# Patient Record
Sex: MALE | Race: WHITE
[De-identification: names, ages, dates, MRNs, and addresses within clinical notes are randomized per-mention and may not be internally consistent; named-entity substitution may affect disease eponyms.]

---

## 2017-07-22 NOTE — PD.PSY.CON
Provisional Diagnosis


Admission Date





Axis I.


Adjustment disorder with depressed mood, history of OCD


Axis II.


Deferred


Axis III.


No significant medical history





History of Present Illness


Service


Psychiatry


Consult Requested By





Primary Care Physician


Unknown


HPI


The patient is a 27-year-old  man, domicile with his girlfriend in 

[place], unemployed, with psychiatric history of OCD, no psychiatric 

admissions, no current medications, history of self cutting, no suicide 

attempts or significant medical history, who comes emergency Department under 

Gomez act by police for allegedly making suicidal statements and cutting 

himself.  He states that his girlfriend was breaking up with him taking their 

kids with her, which made him upset causing him to cut himself.  Patient denies 

any homicidal or suicidal ideations.  On psychiatric evaluation today patient 

is calm, cooperative and a good spirit.  Patient says that yesterday he was 

frustrated and upset with his girlfriend because his giving him.  He might say 

that he wanted to cut himself, but he never made a suicidal statement.  He says 

that he was just trying to manipulate and to project the anger and his 

girlfriend.  At this moment the patient denies anhedonia, he denies hopelessness

, denies depression, he denies suicidal and homicidal ideation, he denies 

visual and auditory hallucinations.  Patient does report occasional use of 

alcohol and marijuana.





Review of Systems


Constitutional:  DENIES: Diaphoretic episodes, Fatigue, Fever, Weight gain, 

Weight loss, Chills, Dizziness, Change in appetite, Night Sweats


Endocrine:  DENIES: Heat/cold intolerance, Polydipsia, Polyuria, Polyphagia


Eyes:  DENIES: Blurred vision, Diplopia, Eye inflammation, Eye pain, Vision loss

, Photosensitivity, Double Vision


Ears, nose, mouth, throat:  DENIES: Tinnitus, Hearing loss, Vertigo, Nasal 

discharge, Oral lesions, Throat pain, Hoarseness, Ear Pain, Running Nose, 

Epistaxis, Sinus Pain, Toothache, Odynophagia


Respiratory:  DENIES: Apneas, Cough, Snoring, Wheezing, Hemoptysis, Sputum 

production, Shortness of breath


Cardiovascular:  DENIES: Chest pain, Palpitations, Syncope, Dyspnea on Exertion

, PND, Lower Extremity Edema, Orthopnea, Claudication


Gastrointestinal:  DENIES: Abdominal pain, Black stools, Bloody stools, 

Constipation, Diarrhea, Nausea, Vomiting, Difficulty Swallowing, Anorexia


Genitourinary:  DENIES: Sexual dysfunction, Urinary frequency, Urinary 

incontinence, Urgency, Hematuria, Dysuria, Nocturia, Penile Discharge, 

Testicular Pain, Testicular Swelling


Musculoskeletal:  DENIES: Joint pain, Muscle aches, Stiffness, Joint Swelling, 

Back pain, Neck pain


Immunologic/allergic:  DENIES: Eczema, Urticaria


Neurologic:  DENIES: Abnormal gait, Headache, Localized weakness, Paresthesias, 

Seizures, Speech Problems, Tremor, Poor Balance


Psychiatric:  DENIES: Anxiety, Confusion, Mood changes, Depression, 

Hallucinations, Agitation, Suicidal Ideation, Homicidal Ideation, Delusions





Past Family Social History


Coded Allergies:  


     No Known Allergies (Unverified , 7/22/17)


No Active Prescriptions or Reported Meds


Family History


He reports that his mother has depression


Social History


Patient was born and raised in [place], he lives with his girlfriend in [facility]

, he has 2 kids his unemployed, his level of education is high school


Patient's Strengths (min. 2)


Employed





Physical Exam


No tremors, no EPS, no withdrawal


Vital Signs





 Vital Signs








  Date Time  Temp Pulse Resp B/P Pulse Ox O2 Delivery O2 Flow Rate FiO2


 


7/22/17 08:15  73 16 130/77 97 Room Air  


 


7/22/17 00:12 98.6       








Lab Results





Labs





 Laboratory Tests








Test 7/22/17 7/22/17





 00:30 01:15


 


Urine Opiates Screen NEG  


 


Urine Barbiturates Screen NEG  


 


Urine Amphetamines Screen NEG  


 


Urine Benzodiazepines Screen NEG  


 


Urine Cocaine Screen NEG  


 


Urine Cannabinoids Screen POS  


 


White Blood Count  14.8 TH/MM3


 


Red Blood Count  5.25 MIL/MM3


 


Hemoglobin  16.2 GM/DL


 


Hematocrit  48.2 %


 


Mean Corpuscular Volume  91.7 FL


 


Mean Corpuscular Hemoglobin  30.9 PG


 


Mean Corpuscular Hemoglobin  33.7 %





Concent  


 


Red Cell Distribution Width  13.4 %


 


Platelet Count  281 TH/MM3


 


Mean Platelet Volume  7.4 FL


 


Neutrophils (%) (Auto)  87.4 %


 


Lymphocytes (%) (Auto)  9.3 %


 


Monocytes (%) (Auto)  3.0 %


 


Eosinophils (%) (Auto)  0.1 %


 


Basophils (%) (Auto)  0.2 %


 


Neutrophils # (Auto)  12.9 TH/MM3


 


Lymphocytes # (Auto)  1.4 TH/MM3


 


Monocytes # (Auto)  0.4 TH/MM3


 


Eosinophils # (Auto)  0.0 TH/MM3


 


Basophils # (Auto)  0.0 TH/MM3


 


CBC Comment  DIFF FINAL 


 


Differential Comment   


 


Sodium Level  141 MEQ/L


 


Potassium Level  4.1 MEQ/L


 


Chloride Level  106 MEQ/L


 


Carbon Dioxide Level  23.2 MEQ/L


 


Anion Gap  12 MEQ/L


 


Blood Urea Nitrogen  8 MG/DL


 


Creatinine  0.72 MG/DL


 


Estimat Glomerular Filtration  131 ML/MIN





Rate  


 


Random Glucose  82 MG/DL


 


Calcium Level  8.9 MG/DL


 


Total Bilirubin  0.3 MG/DL


 


Aspartate Amino Transf  19 U/L





(AST/SGOT)  


 


Alanine Aminotransferase  23 U/L





(ALT/SGPT)  


 


Alkaline Phosphatase  86 U/L


 


Total Protein  7.1 GM/DL


 


Albumin  4.2 GM/DL


 


Salicylates Level  LESS THAN 1.7





  MG/DL


 


Acetaminophen Level  LESS THAN 2.0





  MCG/ML


 


Ethyl Alcohol Level  19 MG/DL











Mental Status Examination


Appearance


 young man, age appearing, good hygiene, calm and cooperative


Speech:  Unremarkable


Orientation:  x3


Memory:  Unremarkable


Thought Process:  Logical


Thought Content:  Unremarkable


Hallucination Type:  None


Suicidal Ideation:  No


Previous Suicide Attempts:  No


Homicidal Ideation:  No


Previous Homicide Attempts:  No


Judgment:  WNL


Affect:  Good


Mood:  Appropriate


Motor Activity:  Normal gait





Assessment & Plan


Problem List:  


(1) Adjustment disorder with depressed mood


Assessment & Plan:  On psychiatric evaluation today patient does not present 

any evidence of depression, anxiety, antonio or psychosis.  Patient denies 

suicidal and homicidal ideation, he denies visual and auditory hallucinations.  

Recent self cutting statement to his girlfriend in the context of frustration 

and recent breakup as per patient was done with the purpose of manipulation and 

projecting anger.  Patient has a history of self cutting behavior which seems 

to be consistent with underlying personality pathology.  No admission indicated 

at this moment.  Support, motivation psycho education provided.  Gomez act will 

be lifted..


ICD Code:  F43.21


Assessment & Plan


Estimated LOS:  Homer Leon MD Jul 22, 2017 10:40

## 2017-07-22 NOTE — PD
HPI


Chief Complaint:  Psychiatric Symptoms


Time Seen by Provider:  01:38


Travel History


International Travel<30 days:  No


Contact w/Intl Traveler<30days:  No


Traveled to known affect area:  No





History of Present Illness


HPI


Patient comes emergency Department under [place] act by police for allegedly 

making suicidal statements and cutting himself.  He states that his girlfriend 

was breaking up with him taking their kids with her, which made him upset 

causing him to cut himself.  Patient denies any homicidal or suicidal 

ideations.  Patient denies any history of suicide attempt.  Patient denies any 

history of cutting himself before.  Patient denies any pain with this.  Denies 

anything making it better or worse.  Reports his tetanus shot is up-to-date.  

Denies any medical concerns at this time.  Denies any chest pain, shortness of 

breath, fevers, abdominal pain, or numbness tingling anywhere.





PFSH


Past Medical History


ADHD:  Yes


Cardiovascular Problems:  Yes (PRE SVT)


Psychiatric:  Yes (OCD)


Immunizations Current:  Yes


Tetanus Vaccination:  Unknown





Past Surgical History


Cardiac Surgery:  Yes (PSVT (CRYOABLATION))





Social History


Alcohol Use:  Yes


Tobacco Use:  No


Substance Use:  Yes (MARIJUANA RARELY)





Allergies-Medications


(Allergen,Severity, Reaction):  


Coded Allergies:  


     No Known Allergies (Unverified , 7/22/17)


Reported Meds & Prescriptions





Reported Meds & Active Scripts


Active


No Active Prescriptions or Reported Medications    








Review of Systems


Except as stated in HPI:  all other systems reviewed are Neg





Physical Exam


Narrative


GENERAL: Well-developed, well nourished, in no acute distress, and non-ill 

appearing.


SKIN: Multiple superficial nonrepairable self-inflicted wounds noted volar 

surface right forearm.


HEAD: Atraumatic. Normocephalic. 


EYES: Pupils equal and round. EOMI. No scleral icterus. No injection or 

drainage. 


ENT: No nasal bleeding or discharge.  Mucous membranes pink and moist.


NECK: Trachea midline. Supple.  No nuclear rigidity.


CARDIOVASCULAR: Regular rate and rhythm.  No murmur appreciated.


RESPIRATORY: No accessory muscle use.  No respiratory distress. Clear to 

auscultation. Breath sounds equal bilaterally. 


MUSCULOSKELETAL: No obvious deformities. No clubbing.  No cyanosis.  No edema.  

Full range of motion.


NEUROLOGICAL: Awake and alert. No obvious cranial nerve deficits.  Motor 

grossly within normal limits. Normal speech.


PSYCHIATRIC: Appropriate mood and affect; insight and judgment normal.





Data


Data


Last Documented VS





Vital Signs








  Date Time  Temp Pulse Resp B/P Pulse Ox O2 Delivery O2 Flow Rate FiO2


 


7/22/17 00:12 98.6 94 20 123/64 99 Room Air  








Orders





 Complete Blood Count With Diff (7/22/17 01:10)


Comprehensive Metabolic Panel (7/22/17 01:10)


Psych Screen (7/22/17 01:10)


Drug Screen, Random Urine (7/22/17 01:10)


Alcohol (Ethanol) (7/22/17 01:10)


Salicylates (Aspirin) (7/22/17 01:10)


Tylenol (Acetaminophen) (7/22/17 01:10)





Labs





 Laboratory Tests








Test 7/22/17 7/22/17





 00:30 01:15


 


Urine Opiates Screen NEG  


 


Urine Barbiturates Screen NEG  


 


Urine Amphetamines Screen NEG  


 


Urine Benzodiazepines Screen NEG  


 


Urine Cocaine Screen NEG  


 


Urine Cannabinoids Screen POS  


 


White Blood Count  14.8 TH/MM3


 


Red Blood Count  5.25 MIL/MM3


 


Hemoglobin  16.2 GM/DL


 


Hematocrit  48.2 %


 


Mean Corpuscular Volume  91.7 FL


 


Mean Corpuscular Hemoglobin  30.9 PG


 


Mean Corpuscular Hemoglobin  33.7 %





Concent  


 


Red Cell Distribution Width  13.4 %


 


Platelet Count  281 TH/MM3


 


Mean Platelet Volume  7.4 FL


 


Neutrophils (%) (Auto)  87.4 %


 


Lymphocytes (%) (Auto)  9.3 %


 


Monocytes (%) (Auto)  3.0 %


 


Eosinophils (%) (Auto)  0.1 %


 


Basophils (%) (Auto)  0.2 %


 


Neutrophils # (Auto)  12.9 TH/MM3


 


Lymphocytes # (Auto)  1.4 TH/MM3


 


Monocytes # (Auto)  0.4 TH/MM3


 


Eosinophils # (Auto)  0.0 TH/MM3


 


Basophils # (Auto)  0.0 TH/MM3


 


CBC Comment  DIFF FINAL 


 


Differential Comment   


 


Sodium Level  141 MEQ/L


 


Potassium Level  4.1 MEQ/L


 


Chloride Level  106 MEQ/L


 


Carbon Dioxide Level  23.2 MEQ/L


 


Anion Gap  12 MEQ/L


 


Blood Urea Nitrogen  8 MG/DL


 


Creatinine  0.72 MG/DL


 


Estimat Glomerular Filtration  131 ML/MIN





Rate  


 


Random Glucose  82 MG/DL


 


Calcium Level  8.9 MG/DL


 


Total Bilirubin  0.3 MG/DL


 


Aspartate Amino Transf  19 U/L





(AST/SGOT)  


 


Alanine Aminotransferase  23 U/L





(ALT/SGPT)  


 


Alkaline Phosphatase  86 U/L


 


Total Protein  7.1 GM/DL


 


Albumin  4.2 GM/DL


 


Salicylates Level  LESS THAN 1.7





  MG/DL


 


Acetaminophen Level  LESS THAN 2.0





  MCG/ML


 


Ethyl Alcohol Level  19 MG/DL











MDM


Medical Decision Making


Medical Screen Exam Complete:  Yes


Emergency Medical Condition:  Yes


Differential Diagnosis


Homicidal, suicidal, laceration, abrasion, contusion, other


Narrative Course


Patient was seen and examined.  Labs were obtained and reviewed.  Patient 

medically cleared for further treatment and evaluation by psych.  Final 

disposition per psych.





Diagnosis





 Primary Impression:  


 Deliberate self-cutting


 Additional Impression:  


 Medical clearance for psychiatric admission


Scripts


No Active Prescriptions or Reported Meds


Condition:  Stable








Naman Thao Jul 22, 2017 01:39

## 2018-06-19 ENCOUNTER — HOSPITAL ENCOUNTER (INPATIENT)
Dept: HOSPITAL 17 - NEPD | Age: 28
LOS: 2 days | Discharge: LEFT BEFORE DISCHARGE | DRG: 603 | End: 2018-06-21
Attending: HOSPITALIST | Admitting: HOSPITALIST
Payer: SELF-PAY

## 2018-06-19 VITALS — HEIGHT: 72 IN | WEIGHT: 147.71 LBS | BODY MASS INDEX: 20.01 KG/M2

## 2018-06-19 VITALS
RESPIRATION RATE: 20 BRPM | TEMPERATURE: 99.9 F | DIASTOLIC BLOOD PRESSURE: 58 MMHG | SYSTOLIC BLOOD PRESSURE: 123 MMHG | OXYGEN SATURATION: 98 % | HEART RATE: 103 BPM

## 2018-06-19 VITALS
SYSTOLIC BLOOD PRESSURE: 120 MMHG | HEART RATE: 89 BPM | RESPIRATION RATE: 18 BRPM | DIASTOLIC BLOOD PRESSURE: 56 MMHG | OXYGEN SATURATION: 100 %

## 2018-06-19 VITALS
TEMPERATURE: 100 F | OXYGEN SATURATION: 97 % | RESPIRATION RATE: 18 BRPM | SYSTOLIC BLOOD PRESSURE: 131 MMHG | HEART RATE: 88 BPM | DIASTOLIC BLOOD PRESSURE: 65 MMHG

## 2018-06-19 VITALS — RESPIRATION RATE: 16 BRPM | OXYGEN SATURATION: 98 % | TEMPERATURE: 99.4 F | HEART RATE: 89 BPM
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
## 2018-06-19 DIAGNOSIS — F12.90: ICD-10-CM

## 2018-06-19 DIAGNOSIS — E87.6: ICD-10-CM

## 2018-06-19 DIAGNOSIS — F19.90: ICD-10-CM

## 2018-06-19 DIAGNOSIS — L03.113: Primary | ICD-10-CM

## 2018-06-19 DIAGNOSIS — F17.210: ICD-10-CM

## 2018-06-19 DIAGNOSIS — T23.101A: ICD-10-CM
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
## 2018-06-19 DIAGNOSIS — I89.1: ICD-10-CM
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
## 2018-06-19 LAB
ALBUMIN SERPL-MCNC: 3.9 GM/DL (ref 3.4–5)
ALP SERPL-CCNC: 104 U/L (ref 45–117)
ALT SERPL-CCNC: 23 U/L (ref 12–78)
AST SERPL-CCNC: 18 U/L (ref 15–37)
BACTERIA #/AREA URNS HPF: (no result) /HPF
BASOPHILS # BLD AUTO: 0 TH/MM3 (ref 0–0.2)
BASOPHILS NFR BLD: 0.1 % (ref 0–2)
BILIRUB SERPL-MCNC: 0.5 MG/DL (ref 0.2–1)
BUN SERPL-MCNC: 7 MG/DL (ref 7–18)
CALCIUM SERPL-MCNC: 8.9 MG/DL (ref 8.5–10.1)
CHLORIDE SERPL-SCNC: 103 MEQ/L (ref 98–107)
COLOR UR: YELLOW
CREAT SERPL-MCNC: 0.8 MG/DL (ref 0.6–1.3)
EOSINOPHIL # BLD: 0 TH/MM3 (ref 0–0.4)
EOSINOPHIL NFR BLD: 0 % (ref 0–4)
ERYTHROCYTE [DISTWIDTH] IN BLOOD BY AUTOMATED COUNT: 14.1 % (ref 11.6–17.2)
GFR SERPLBLD BASED ON 1.73 SQ M-ARVRAT: 116 ML/MIN (ref 89–?)
GLUCOSE SERPL-MCNC: 98 MG/DL (ref 74–106)
GLUCOSE UR STRIP-MCNC: (no result) MG/DL
HCO3 BLD-SCNC: 22.6 MEQ/L (ref 21–32)
HCT VFR BLD CALC: 43.1 % (ref 39–51)
HGB BLD-MCNC: 14.3 GM/DL (ref 13–17)
HGB UR QL STRIP: (no result)
HYALINE CASTS #/AREA URNS LPF: 4 /LPF
KETONES UR STRIP-MCNC: (no result) MG/DL
LYMPHOCYTES # BLD AUTO: 0.6 TH/MM3 (ref 1–4.8)
LYMPHOCYTES NFR BLD AUTO: 3.3 % (ref 9–44)
MCH RBC QN AUTO: 30.7 PG (ref 27–34)
MCHC RBC AUTO-ENTMCNC: 33.2 % (ref 32–36)
MCV RBC AUTO: 92.5 FL (ref 80–100)
MONOCYTE #: 1.4 TH/MM3 (ref 0–0.9)
MONOCYTES NFR BLD: 7.9 % (ref 0–8)
MUCOUS THREADS #/AREA URNS LPF: (no result) /LPF
NEUTROPHILS # BLD AUTO: 16.3 TH/MM3 (ref 1.8–7.7)
NEUTROPHILS NFR BLD AUTO: 88.7 % (ref 16–70)
NITRITE UR QL STRIP: (no result)
PLATELET # BLD: 199 TH/MM3 (ref 150–450)
PMV BLD AUTO: 7.8 FL (ref 7–11)
PROT SERPL-MCNC: 7 GM/DL (ref 6.4–8.2)
RBC # BLD AUTO: 4.66 MIL/MM3 (ref 4.5–5.9)
SODIUM SERPL-SCNC: 137 MEQ/L (ref 136–145)
SP GR UR STRIP: 1.02 (ref 1–1.03)
URINE LEUKOCYTE ESTERASE: (no result)
WBC # BLD AUTO: 18.3 TH/MM3 (ref 4–11)

## 2018-06-19 PROCEDURE — 80307 DRUG TEST PRSMV CHEM ANLYZR: CPT

## 2018-06-19 PROCEDURE — 87040 BLOOD CULTURE FOR BACTERIA: CPT

## 2018-06-19 PROCEDURE — 81001 URINALYSIS AUTO W/SCOPE: CPT

## 2018-06-19 PROCEDURE — 83605 ASSAY OF LACTIC ACID: CPT

## 2018-06-19 PROCEDURE — 85025 COMPLETE CBC W/AUTO DIFF WBC: CPT

## 2018-06-19 PROCEDURE — 80053 COMPREHEN METABOLIC PANEL: CPT

## 2018-06-19 PROCEDURE — 84100 ASSAY OF PHOSPHORUS: CPT

## 2018-06-19 PROCEDURE — 84439 ASSAY OF FREE THYROXINE: CPT

## 2018-06-19 PROCEDURE — 73220 MRI UPPR EXTREMITY W/O&W/DYE: CPT

## 2018-06-19 PROCEDURE — 87086 URINE CULTURE/COLONY COUNT: CPT

## 2018-06-19 PROCEDURE — 84443 ASSAY THYROID STIM HORMONE: CPT

## 2018-06-19 PROCEDURE — 80202 ASSAY OF VANCOMYCIN: CPT

## 2018-06-19 PROCEDURE — 96368 THER/DIAG CONCURRENT INF: CPT

## 2018-06-19 PROCEDURE — A9579 GAD-BASE MR CONTRAST NOS,1ML: HCPCS

## 2018-06-19 PROCEDURE — 83735 ASSAY OF MAGNESIUM: CPT

## 2018-06-19 PROCEDURE — 83036 HEMOGLOBIN GLYCOSYLATED A1C: CPT

## 2018-06-19 PROCEDURE — 96365 THER/PROPH/DIAG IV INF INIT: CPT

## 2018-06-19 RX ADMIN — PHENYTOIN SODIUM SCH MLS/HR: 50 INJECTION INTRAMUSCULAR; INTRAVENOUS at 23:22

## 2018-06-19 RX ADMIN — OXYCODONE HYDROCHLORIDE AND ACETAMINOPHEN PRN TAB: 5; 325 TABLET ORAL at 23:16

## 2018-06-19 RX ADMIN — Medication SCH ML: at 23:22

## 2018-06-19 RX ADMIN — TAZOBACTAM SODIUM AND PIPERACILLIN SODIUM SCH MLS/HR: 375; 3 INJECTION, SOLUTION INTRAVENOUS at 23:22

## 2018-06-19 NOTE — PD
Data


Data


Last Documented VS





Vital Signs








  Date Time  Temp Pulse Resp B/P (MAP) Pulse Ox O2 Delivery O2 Flow Rate FiO2


 


6/19/18 18:16 99.4 89 16  98 Room Air  








Orders





 Orders


Sepsis Workup Initiated (6/19/18 )


Complete Blood Count With Diff (6/19/18 16:24)


Comprehensive Metabolic Panel (6/19/18 16:24)


Lactic Acid Sepsis Protocol (6/19/18 16:24)


Urinalysis - C+S If Indicated (6/19/18 16:24)


Blood Culture (6/19/18 16:24)


Blood Glucose (6/19/18 16:24)


Ecg Monitoring (6/19/18 16:24)


Iv Access Insert/Monitor (6/19/18 16:24)


Oximetry (6/19/18 16:24)


Oxygen Administration (6/19/18 16:24)


Acetaminophen (Tylenol) (6/19/18 16:30)


Piperacil-Tazo 4.5 Gm Premix (Zosyn 4.5 (6/19/18 16:24)


Vancomycin Inj (Vancomycin Inj) (6/19/18 16:24)


Sodium Chlor 0.9% 1000 Ml Inj (Ns 1000 M (6/19/18 16:24)


Sodium Chlor 0.9% 1000 Ml Inj (Ns 1000 M (6/19/18 16:24)


Urine Culture (6/19/18 17:00)


Admit Order (Ed Use Only) (6/19/18 )


Admit To Inpatient (6/19/18 )


Vital Signs (Adult) Q4H (6/19/18 18:40)


Activity Oob Ad Sridevi (6/19/18 18:40)


Diet Npo (6/19/18 Dinner)


Sodium Chlor 0.9% 1000 Ml Inj (Ns 1000 M (6/19/18 18:40)


Sodium Chloride 0.9% Flush (Ns Flush) (6/19/18 18:45)


Sodium Chloride 0.9% Flush (Ns Flush) (6/19/18 21:00)


Comprehensive Metabolic Panel (6/20/18 06:00)


Complete Blood Count With Diff (6/20/18 06:00)


Scd Bilateral/Knee High PUJA.BID (6/19/18 18:40)


Naloxone Inj (Narcan Inj) (6/19/18 18:45)


Magnesium Hydroxide Liq (Milk Of Magnesi (6/19/18 18:45)


Sennosides (Senokot) (6/19/18 18:45)


Bisacodyl Supp (Dulcolax Supp) (6/19/18 18:45)


Lactulose Liq (Lactulose Liq) (6/19/18 18:45)


Inpatient Certification (6/19/18 )


Vancomycin Consult Pharmacy (Vancomycin (6/19/18 18:45)


Piperacil-Tazo 3.375 Gm Premix (Zosyn 3. (6/19/18 18:45)


Consult Infectious Disease (6/19/18 )


Drug Screen, Random Urine (6/19/18 18:42)





Labs





Laboratory Tests








Test


  6/19/18


17:00


 


White Blood Count 18.3 TH/MM3 


 


Red Blood Count 4.66 MIL/MM3 


 


Hemoglobin 14.3 GM/DL 


 


Hematocrit 43.1 % 


 


Mean Corpuscular Volume 92.5 FL 


 


Mean Corpuscular Hemoglobin 30.7 PG 


 


Mean Corpuscular Hemoglobin


Concent 33.2 % 


 


 


Red Cell Distribution Width 14.1 % 


 


Platelet Count 199 TH/MM3 


 


Mean Platelet Volume 7.8 FL 


 


Neutrophils (%) (Auto) 88.7 % 


 


Lymphocytes (%) (Auto) 3.3 % 


 


Monocytes (%) (Auto) 7.9 % 


 


Eosinophils (%) (Auto) 0.0 % 


 


Basophils (%) (Auto) 0.1 % 


 


Neutrophils # (Auto) 16.3 TH/MM3 


 


Lymphocytes # (Auto) 0.6 TH/MM3 


 


Monocytes # (Auto) 1.4 TH/MM3 


 


Eosinophils # (Auto) 0.0 TH/MM3 


 


Basophils # (Auto) 0.0 TH/MM3 


 


CBC Comment DIFF FINAL 


 


Differential Comment  


 


Urine Color YELLOW 


 


Urine Turbidity CLEAR 


 


Urine pH 6.0 


 


Urine Specific Gravity 1.016 


 


Urine Protein NEG mg/dL 


 


Urine Glucose (UA) NEG mg/dL 


 


Urine Ketones TRACE mg/dL 


 


Urine Occult Blood NEG 


 


Urine Nitrite NEG 


 


Urine Bilirubin NEG 


 


Urine Urobilinogen 2.0 mg/dL 


 


Urine Leukocyte Esterase NEG 


 


Urine RBC 3 /hpf 


 


Urine WBC 1 /hpf 


 


Urine Bacteria RARE /hpf 


 


Urine Hyaline Casts 4 /lpf 


 


Urine Mucus FEW /lpf 


 


Microscopic Urinalysis Comment


  CATH-CULTURE


IND


 


Blood Urea Nitrogen 7 MG/DL 


 


Creatinine 0.80 MG/DL 


 


Random Glucose 98 MG/DL 


 


Total Protein 7.0 GM/DL 


 


Albumin 3.9 GM/DL 


 


Calcium Level 8.9 MG/DL 


 


Alkaline Phosphatase 104 U/L 


 


Aspartate Amino Transf


(AST/SGOT) 18 U/L 


 


 


Alanine Aminotransferase


(ALT/SGPT) 23 U/L 


 


 


Total Bilirubin 0.5 MG/DL 


 


Sodium Level 137 MEQ/L 


 


Potassium Level 3.4 MEQ/L 


 


Chloride Level 103 MEQ/L 


 


Carbon Dioxide Level 22.6 MEQ/L 


 


Anion Gap 11 MEQ/L 


 


Estimat Glomerular Filtration


Rate 116 ML/MIN 


 


 


Lactic Acid Level 1.3 mmol/L 











MDM


Supervised Visit with HEENA:  No


Narrative Course


27-year-old man with infection the hand with lymphangitis spreading up all the 

way to the axilla with lymphadenitis associated.  Patient with tachycardia 

meets criteria for sepsis.  Will be admitted for IV antibiotics.


Diagnosis





 Primary Impression:  


 Cellulitis


 Qualified Codes:  L03.113 - Cellulitis of right upper limb


 Additional Impression:  


 Lymphadenitis


Scripts


No Active Prescriptions or Reported Meds


Condition:  Stable











Jose Manuel Payne MD Jun 19, 2018 19:24

## 2018-06-19 NOTE — HHI.HP
__________________________________________________





\[facility] [place] [facility]\""


Service


[facility]


Primary Care Physician


No Primary Care Physician


Admission Diagnosis





sepsis, cellulitis


Diagnoses:  


Travel History


International Travel<30 Days:  No


Contact w/Intl Traveler <30 Da:  No


Traveled to Known Affected Are:  No


History of Present Illness


27-year-old male with no significant past medical history presents the 

emergency department for evaluation of swelling of his right hand.  The patient 

reports that approximately 3 days ago he burned himself on the oven at work.  

He states that since that time his hand has become more red and swollen and now 

has red streaks going up his arm towards his axilla.  He endorses subjective, 

severe fever/chills.  No chest pain or shortness of breath.  No cough.  No 

abdominal pain.  No nausea/vomiting/diarrhea.





Review of Systems


Except as stated in HPI:  all other systems reviewed are Neg





Past Family Social History


Past Medical History


None


Past Surgical History


Cardiac ablation for unspecified tachycardia


Reported Medications





Reported Meds & Active Scripts


Active


No Active Prescriptions or Reported Medications


Allergies:  


Coded Allergies:  


     No Known Allergies (Verified  Allergy, Unknown, 18)


Family History


Negative for CAD/DM


Social History


Smokes approximately 1 pack per day.  Occasional alcohol.  Positive marijuana.  

Denies all other illicit drugs.





Physical Exam


Vital Signs





Vital Signs








  Date Time  Temp Pulse Resp B/P (MAP) Pulse Ox O2 Delivery O2 Flow Rate FiO2


 


18 18:16 99.4 89 16  98 Room Air  


 


18 16:45     100 Room Air  


 


18 16:45  89 18 120/56 (77) 100 Room Air  


 


18 16:45  89 18 120/56 (77) 100 Room Air  


 


18 16:00 99.9 103 20 123/58 (79) 98   








Physical Exam


GENERAL:  male lying in bed


SKIN: Significant edema and erythema of the right hand worse on the dorsal 

aspect.  3 areas of first-degree burn on the knuckles.  Lymphangitis up the arm 

with axillary lymphadenopathy.


HEAD: Atraumatic. Normocephalic. No temporal or scalp tenderness.


EYES: Pupils equal round and reactive. Extraocular motions intact. No scleral 

icterus. No injection or drainage. 


ENT: Nose without bleeding, purulent drainage or septal hematoma. Throat 

without erythema, tonsillar hypertrophy or exudate. Uvula midline. Airway 

patent.


NECK: Trachea midline. No JVD or lymphadenopathy. Supple, nontender, no 

meningeal signs.


CARDIOVASCULAR: Regular rate and rhythm without murmurs, gallops, or rubs. 


RESPIRATORY: Clear to auscultation. Breath sounds equal bilaterally. No wheezes

, rales, or rhonchi.  


GASTROINTESTINAL: Abdomen soft, non-tender, nondistended. No hepato-splenomegaly

, or palpable masses. No guarding.


MUSCULOSKELETAL: Extremities without clubbing, cyanosis, or edema. No joint 

tenderness, effusion, or edema noted. No calf tenderness. 


NEUROLOGICAL: Awake and alert. Cranial nerves II through XII intact.  Motor and 

sensory grossly within normal limits. Normal speech.


Laboratory





Laboratory Tests








Test


  18


17:00


 


White Blood Count 18.3 


 


Red Blood Count 4.66 


 


Hemoglobin 14.3 


 


Hematocrit 43.1 


 


Mean Corpuscular Volume 92.5 


 


Mean Corpuscular Hemoglobin 30.7 


 


Mean Corpuscular Hemoglobin


Concent 33.2 


 


 


Red Cell Distribution Width 14.1 


 


Platelet Count 199 


 


Mean Platelet Volume 7.8 


 


Neutrophils (%) (Auto) 88.7 


 


Lymphocytes (%) (Auto) 3.3 


 


Monocytes (%) (Auto) 7.9 


 


Eosinophils (%) (Auto) 0.0 


 


Basophils (%) (Auto) 0.1 


 


Neutrophils # (Auto) 16.3 


 


Lymphocytes # (Auto) 0.6 


 


Monocytes # (Auto) 1.4 


 


Eosinophils # (Auto) 0.0 


 


Basophils # (Auto) 0.0 


 


CBC Comment DIFF FINAL 


 


Differential Comment  


 


Urine Color YELLOW 


 


Urine Turbidity CLEAR 


 


Urine pH 6.0 


 


Urine Specific Gravity 1.016 


 


Urine Protein NEG 


 


Urine Glucose (UA) NEG 


 


Urine Ketones TRACE 


 


Urine Occult Blood NEG 


 


Urine Nitrite NEG 


 


Urine Bilirubin NEG 


 


Urine Urobilinogen 2.0 


 


Urine Leukocyte Esterase NEG 


 


Urine RBC 3 


 


Urine WBC 1 


 


Urine Bacteria RARE 


 


Urine Hyaline Casts 4 


 


Urine Mucus FEW 


 


Microscopic Urinalysis Comment


  CATH-CULTURE


IND


 


Blood Urea Nitrogen 7 


 


Creatinine 0.80 


 


Random Glucose 98 


 


Total Protein 7.0 


 


Albumin 3.9 


 


Calcium Level 8.9 


 


Alkaline Phosphatase 104 


 


Aspartate Amino Transf


(AST/SGOT) 18 


 


 


Alanine Aminotransferase


(ALT/SGPT) 23 


 


 


Total Bilirubin 0.5 


 


Sodium Level 137 


 


Potassium Level 3.4 


 


Chloride Level 103 


 


Carbon Dioxide Level 22.6 


 


Anion Gap 11 


 


Estimat Glomerular Filtration


Rate 116 


 


 


Lactic Acid Level 1.3 














 Date/Time


Source Procedure


Growth Status


 


 


 18 17:00


Blood Peripheral Aerobic Blood Culture


Pending Received


 


 18 17:00


Blood Peripheral Anaerobic Blood Culture


Pending Received


 


 18 17:00


Urine Catheterized Urine Urine Culture


Pending Received








Result Diagram:  


18 1700                                                                   

             18 1700








Caprini VTE Risk Assessment


Caprini VTE Risk Assessment:  No/Low Risk (score <= 1)


Caprini Risk Assessment Model











 Point Value = 1          Point Value = 2  Point Value = 3  Point Value = 5


 


Age 41-60


Minor surgery


BMI > 25 kg/m2


Swollen legs


Varicose veins


Pregnancy or postpartum


History of unexplained or recurrent


   spontaneous 


Oral contraceptives or hormone


   replacement


Sepsis (< 1 month)


Serious lung disease, including


   pneumonia (< 1 month)


Abnormal pulmonary function


Acute myocardial infarction


Congestive heart failure (< 1 month)


History of inflammatory bowel disease


Medical patient at bed rest Age 61-74


Arthroscopic surgery


Major open surgery (> 45 min)


Laparoscopic surgery (> 45 min)


Malignancy


Confined to bed (> 72 hours)


Immobilizing plaster cast


Central venous access Age >= 75


History of VTE


Family history of VTE


Factor V Leiden


Prothrombin [id number]


Lupus anticoagulant


Anticardiolipin antibodies


Elevated serum homocysteine


Heparin-induced thrombocytopenia


Other congenital or acquired


   thrombophilia Stroke (< 1 month)


Elective arthroplasty


Hip, pelvis, or leg fracture


Acute spinal cord injury (< 1 month)








Prophylaxis Regimen











   Total Risk


Factor Score Risk Level Prophylaxis Regimen


 


0-1      Low Early ambulation


 


2 Moderate Order ONE of the following:


*Sequential Compression Device (SCD)


*Heparin 5000 units SQ BID


 


3-4 Higher Order ONE of the following medications:


*Heparin 5000 units SQ TID


*Enoxaparin/Lovenox 40 mg SQ daily (WT < 150 kg, CrCl > 30 mL/min)


*Enoxaparin/Lovenox 30 mg SQ daily (WT < 150 kg, CrCl > 10-29 mL/min)


*Enoxaparin/Lovenox 30 mg SQ BID (WT < 150 kg, CrCl > 30 mL/min)


AND/OR


*Sequential Compression Device (SCD)


 


5 or more Highest Order ONE of the following medications:


*Heparin 5000 units SQ TID (Preferred with Epidurals)


*Enoxaparin/Lovenox 40 mg SQ daily (WT < 150 kg, CrCl > 30 mL/min)


*Enoxaparin/Lovenox 30 mg SQ daily (WT < 150 kg, CrCl > 10-29 mL/min)


*Enoxaparin/Lovenox 30 mg SQ BID (WT < 150 kg, CrCl > 30 mL/min)


AND


*Sequential Compression Device (SCD)











Assessment and Plan


Assessment and Plan


Assessment/plan:





1.  Cellulitis/lymphangitis/sepsis


Blood cultures pending


Vancomycin/Zosyn


Infectious disease consulted, appreciate recommendations





2.  Hypokalemia


Patient with mild hypokalemia


Status post p.o. supplementation


Monitor BMP





FEN


Regular diet


Electrolytes: As above


NS at 100 cc/hour





Physician Certification


2 Midnight Certification Type:  Admission for Inpatient Services


Order for Inpatient Services


The services are ordered in accordance with Medicare regulations or non-

Medicare payer requirements, as applicable.  In the case of services not 

specified as inpatient-only, they are appropriately provided as inpatient 

services in accordance with the 2-midnight benchmark.


Estimated LOS (days):  2


2 days is the estimated time the patient will need to remain in the hospital, 

assuming treatment plan goals are met and no additional complications.


Post-Hospital Plan:  Not yet determined











Caroline Wallace MD 2018 19:56

## 2018-06-19 NOTE — PD
HPI


Chief Complaint:  Skin Problem


Time Seen by Provider:  16:16


Travel History


International Travel<30 days:  No


Contact w/Intl Traveler<30days:  No


Traveled to known affect area:  No





History of Present Illness


HPI


The patient is a 27-year-old  male who presents to the emergency 

department via private vehicle for right hand swelling.  The patient states he 

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 

superficial burns to the extensor surface of the hand.  The patient does note 

multiple sores of the extensor surface of the hand over the MCPs as well as the 

base of the right thumb.  He now notes increased swelling to the right hand 

with erythema no streaks of the right upper extremity to the right axilla.  He 

does complain of swelling under the right axilla and over the lateral aspect of 

the right neck.  He does note subjective fever.  He denies any history of MRSA, 

IV drug use, HIV, or diabetes.  Symptoms are moderate.  There are no current 

alleviating factors.  The patient cannot recall his last tetanus shot.  The 

patient is left-hand dominant.





PFSH


Past Medical History


ADHD:  Yes


Cardiovascular Problems:  Yes (PRE SVT)


Psychiatric:  Yes (OCD)


Immunizations Current:  Yes





Past Surgical History


*** Narrative Surgical


Cardiac ablation, removal of testicular appendix, tympanostomy tubes


Cardiac Surgery:  Yes (PSVT (CRYOABLATION))





Social History


Alcohol Use:  Yes


Tobacco Use:  No


Substance Use:  Yes (MARIJUANA, KRATOM)





Allergies-Medications


(Allergen,Severity, Reaction):  


Coded Allergies:  


     No Known Allergies (Verified  Adverse Reaction, Unknown, 6/19/18)


Reported Meds & Prescriptions





Reported Meds & Active Scripts


Active


No Active Prescriptions or Reported Medications    








Review of Systems


Except as stated in HPI:  all other systems reviewed are Neg


General / Constitutional:  Positive: Fever


HENT:  Positive: Neck Pain


Cardiovascular:  No: Chest Pain or Discomfort


Respiratory:  No: Shortness of Breath


Gastrointestinal:  No: Nausea, Vomiting, Abdominal Pain


Genitourinary:  No: Dysuria


Musculoskeletal:  Positive: Limited ROM, Edema, Pain


Skin:  Positive Other (As noted in the history of present illness)


Neurologic:  No: Paresthesia, Sensory Disturbance





Physical Exam


Narrative


GENERAL: Awake, alert, pleasant 27-year-old male who appears his stated age and 

is in no acute respiratory distress.


SKIN: Focused skin assessment warm/dry.  Multiple open skin wounds on the 

extensor surface of the right hand of the MCP.


HEAD: Atraumatic. Normocephalic. 


EYES: Pupils equal and round. No scleral icterus. No injection or drainage. 


ENT: No nasal bleeding or discharge.  Mucous membranes pink and moist.


NECK: Trachea midline. No JVD.  Lymphadenopathy noted behind the right 

sternocleidomastoid which is tender to palpation.  


CARDIOVASCULAR: Regular, tachycardic with a heart rate 105.


RESPIRATORY: No accessory muscle use. Clear to auscultation. Breath sounds 

equal bilaterally. 


GASTROINTESTINAL: Abdomen soft, non-tender, nondistended. Hepatic and splenic 

margins not palpable. 


MUSCULOSKELETAL: The right hand is swollen compared to the left.  The patient 

does have multiple skin lesions on extensor surface of the right hand and the 

base of the right thumb with erythema, there is lymphadenitis noted of the 

right extremity to the axilla.  There is right epitrochlear lymphadenopathy and 

right axillary lymphadenopathy noted and palpated.  Positive right radial 

pulse.  Limited range of motion of the right hand secondary to edema and pain.


NEUROLOGICAL: Awake and alert. No obvious cranial nerve deficits.  Motor 

grossly within normal limits. Normal speech.


PSYCHIATRIC: Appropriate mood and affect; insight and judgment normal.





Data


Data


Last Documented VS





Vital Signs








  Date Time  Temp Pulse Resp B/P (MAP) Pulse Ox O2 Delivery O2 Flow Rate FiO2


 


6/19/18 16:00 99.9 103 20 123/58 (79) 98   








Orders





 Orders


Sepsis Workup Initiated (6/19/18 )


Complete Blood Count With Diff (6/19/18 16:24)


Comprehensive Metabolic Panel (6/19/18 16:24)


Lactic Acid Sepsis Protocol (6/19/18 16:24)


Urinalysis - C+S If Indicated (6/19/18 16:24)


Blood Culture (6/19/18 16:24)


Blood Glucose (6/19/18 16:24)


Ecg Monitoring (6/19/18 16:24)


Iv Access Insert/Monitor (6/19/18 16:24)


Oximetry (6/19/18 16:24)


Oxygen Administration (6/19/18 16:24)


Acetaminophen (Tylenol) (6/19/18 16:30)


Piperacil-Tazo 4.5 Gm Premix (Zosyn 4.5 (6/19/18 16:24)


Vancomycin Inj (Vancomycin Inj) (6/19/18 16:24)


Sodium Chlor 0.9% 1000 Ml Inj (Ns 1000 M (6/19/18 16:24)


Sodium Chlor 0.9% 1000 Ml Inj (Ns 1000 M (6/19/18 16:24)








MDM


Medical Decision Making


Medical Screen Exam Complete:  Yes


Emergency Medical Condition:  Yes


Medical Record Reviewed:  Yes


Differential Diagnosis


Differential diagnosis includes lymphadenitis, lymphangitis, cellulitis, 

infected wound, bacteremia, septicemia, abscess, cellulitis.


Narrative Course


IV was established, labs are drawn and sent, and the patient was placed on 

cardiac telemetry monitoring and continuous pulse oximetry monitoring.  Blood 

cultures were sent to lab.  CBC and lactic acid were sent to lab.  The patient 

was then administered Zosyn and vancomycin.  The patient was administered 2 L 

of IV fluids.  It appears the patient has infected wounds to the right hand 

with cellulitis and secondary lymphangitis/lymphadenitis.  The patient was 

signed out to the oncoming physician at 5 PM.





Diagnosis





 Primary Impression:  


 Cellulitis


 Qualified Codes:  L03.113 - Cellulitis of right upper limb


 Additional Impression:  


 Lymphadenitis





Admitting Information


Admitting Physician Requests:  Admit


Scripts


No Active Prescriptions or Reported Meds


Condition:  Stable











Errol Salazar MD Jun 19, 2018 16:33

## 2018-06-20 VITALS
TEMPERATURE: 99.7 F | SYSTOLIC BLOOD PRESSURE: 118 MMHG | HEART RATE: 82 BPM | DIASTOLIC BLOOD PRESSURE: 66 MMHG | OXYGEN SATURATION: 99 % | RESPIRATION RATE: 16 BRPM

## 2018-06-20 VITALS
DIASTOLIC BLOOD PRESSURE: 57 MMHG | TEMPERATURE: 98.1 F | HEART RATE: 82 BPM | SYSTOLIC BLOOD PRESSURE: 115 MMHG | RESPIRATION RATE: 20 BRPM | OXYGEN SATURATION: 98 %

## 2018-06-20 VITALS
SYSTOLIC BLOOD PRESSURE: 112 MMHG | TEMPERATURE: 98.6 F | HEART RATE: 73 BPM | DIASTOLIC BLOOD PRESSURE: 59 MMHG | RESPIRATION RATE: 16 BRPM | OXYGEN SATURATION: 98 %

## 2018-06-20 VITALS
DIASTOLIC BLOOD PRESSURE: 70 MMHG | RESPIRATION RATE: 16 BRPM | OXYGEN SATURATION: 97 % | HEART RATE: 64 BPM | TEMPERATURE: 98 F | SYSTOLIC BLOOD PRESSURE: 127 MMHG

## 2018-06-20 VITALS
HEART RATE: 92 BPM | RESPIRATION RATE: 19 BRPM | SYSTOLIC BLOOD PRESSURE: 142 MMHG | DIASTOLIC BLOOD PRESSURE: 60 MMHG | OXYGEN SATURATION: 98 % | TEMPERATURE: 99.1 F

## 2018-06-20 VITALS — OXYGEN SATURATION: 98 %

## 2018-06-20 LAB
ALBUMIN SERPL-MCNC: 3.1 GM/DL (ref 3.4–5)
ALP SERPL-CCNC: 115 U/L (ref 45–117)
ALT SERPL-CCNC: 42 U/L (ref 12–78)
AST SERPL-CCNC: 22 U/L (ref 15–37)
BASOPHILS # BLD AUTO: 0 TH/MM3 (ref 0–0.2)
BASOPHILS NFR BLD: 0.2 % (ref 0–2)
BILIRUB SERPL-MCNC: 0.8 MG/DL (ref 0.2–1)
BUN SERPL-MCNC: 6 MG/DL (ref 7–18)
CALCIUM SERPL-MCNC: 8.6 MG/DL (ref 8.5–10.1)
CHLORIDE SERPL-SCNC: 111 MEQ/L (ref 98–107)
CREAT SERPL-MCNC: 0.62 MG/DL (ref 0.6–1.3)
EOSINOPHIL # BLD: 0.1 TH/MM3 (ref 0–0.4)
EOSINOPHIL NFR BLD: 0.7 % (ref 0–4)
ERYTHROCYTE [DISTWIDTH] IN BLOOD BY AUTOMATED COUNT: 14.1 % (ref 11.6–17.2)
GFR SERPLBLD BASED ON 1.73 SQ M-ARVRAT: 156 ML/MIN (ref 89–?)
GLUCOSE SERPL-MCNC: 100 MG/DL (ref 74–106)
HCO3 BLD-SCNC: 19.6 MEQ/L (ref 21–32)
HCT VFR BLD CALC: 40.9 % (ref 39–51)
HGB BLD-MCNC: 13.4 GM/DL (ref 13–17)
LYMPHOCYTES # BLD AUTO: 1 TH/MM3 (ref 1–4.8)
LYMPHOCYTES NFR BLD AUTO: 7.6 % (ref 9–44)
MCH RBC QN AUTO: 30.2 PG (ref 27–34)
MCHC RBC AUTO-ENTMCNC: 32.9 % (ref 32–36)
MCV RBC AUTO: 91.8 FL (ref 80–100)
MONOCYTE #: 1.1 TH/MM3 (ref 0–0.9)
MONOCYTES NFR BLD: 8.2 % (ref 0–8)
NEUTROPHILS # BLD AUTO: 11 TH/MM3 (ref 1.8–7.7)
NEUTROPHILS NFR BLD AUTO: 83.3 % (ref 16–70)
PLATELET # BLD: 185 TH/MM3 (ref 150–450)
PMV BLD AUTO: 8.2 FL (ref 7–11)
PROT SERPL-MCNC: 5.9 GM/DL (ref 6.4–8.2)
RBC # BLD AUTO: 4.45 MIL/MM3 (ref 4.5–5.9)
SODIUM SERPL-SCNC: 141 MEQ/L (ref 136–145)
WBC # BLD AUTO: 13.2 TH/MM3 (ref 4–11)

## 2018-06-20 RX ADMIN — TAZOBACTAM SODIUM AND PIPERACILLIN SODIUM SCH MLS/HR: 375; 3 INJECTION, SOLUTION INTRAVENOUS at 05:20

## 2018-06-20 RX ADMIN — TAZOBACTAM SODIUM AND PIPERACILLIN SODIUM SCH MLS/HR: 375; 3 INJECTION, SOLUTION INTRAVENOUS at 17:01

## 2018-06-20 RX ADMIN — OXYCODONE HYDROCHLORIDE AND ACETAMINOPHEN PRN TAB: 10; 325 TABLET ORAL at 12:46

## 2018-06-20 RX ADMIN — Medication SCH ML: at 20:30

## 2018-06-20 RX ADMIN — OXYCODONE HYDROCHLORIDE AND ACETAMINOPHEN PRN TAB: 5; 325 TABLET ORAL at 07:51

## 2018-06-20 RX ADMIN — Medication SCH ML: at 07:52

## 2018-06-20 RX ADMIN — MORPHINE SULFATE PRN MG: 2 INJECTION, SOLUTION INTRAMUSCULAR; INTRAVENOUS at 22:06

## 2018-06-20 RX ADMIN — PHENYTOIN SODIUM SCH MLS/HR: 50 INJECTION INTRAMUSCULAR; INTRAVENOUS at 07:52

## 2018-06-20 RX ADMIN — PHENYTOIN SODIUM SCH MLS/HR: 50 INJECTION INTRAMUSCULAR; INTRAVENOUS at 12:57

## 2018-06-20 RX ADMIN — STANDARDIZED SENNA CONCENTRATE AND DOCUSATE SODIUM SCH TAB: 8.6; 5 TABLET, FILM COATED ORAL at 20:30

## 2018-06-20 RX ADMIN — OXYCODONE HYDROCHLORIDE AND ACETAMINOPHEN PRN TAB: 10; 325 TABLET ORAL at 17:32

## 2018-06-20 RX ADMIN — SODIUM CHLORIDE SCH MLS/HR: 900 INJECTION INTRAVENOUS at 22:00

## 2018-06-20 RX ADMIN — PHENYTOIN SODIUM SCH MLS/HR: 50 INJECTION INTRAMUSCULAR; INTRAVENOUS at 04:40

## 2018-06-20 RX ADMIN — TAZOBACTAM SODIUM AND PIPERACILLIN SODIUM SCH MLS/HR: 375; 3 INJECTION, SOLUTION INTRAVENOUS at 21:45

## 2018-06-20 RX ADMIN — TAZOBACTAM SODIUM AND PIPERACILLIN SODIUM SCH MLS/HR: 375; 3 INJECTION, SOLUTION INTRAVENOUS at 10:27

## 2018-06-20 RX ADMIN — ENOXAPARIN SODIUM SCH MG: 40 INJECTION SUBCUTANEOUS at 12:46

## 2018-06-20 NOTE — PD.ID.CON
History of Present Illness


Service


ID


Consult Requested By





Reason for Consult


Evaluation and Mment of right hand abscess and cellulitis.


Primary Care Physician


No Primary Care Physician


Diagnoses:  


History of Present Illness


 is a 28 y/o CM with no significant PMHx presents to the emergency 

department for evaluation of swelling of his right hand.  The patient reports 

that approximately 3 days ago he burned himself on the oven at work.  He states 

that since that time his hand has become more red and swollen and now has red 

streaks going up his arm towards his axilla.  





He endorses subjective, severe fever/chills.  


No chest pain or shortness of breath.  


No cough. No abdominal pain.  


No nausea/vomiting/diarrhea.





denies any prior endocarditis, discitis or epidural abscesses.





ID consulted for evaluation and Mment of right hand abscess and cellulitis 

possible tendinitis.





Review of Systems


ROS Limitations:  Poor Historian





Past Family Social History


Allergies:  


Coded Allergies:  


     No Known Allergies (Verified  Allergy, Unknown, 6/19/18)


Past Medical History


Cardiac ablation for unspecified tachycardia


IVDA


Past Surgical History


none per patient.


Reported Medications





Reported Meds & Active Scripts


Active


No Active Prescriptions or Reported Medications


Active Ordered Medications





Current Medications








 Medications


  (Trade)  Dose


 Ordered  Sig/Rigo


 Route  Start Time


 Stop Time Status Last Admin


 


 Sodium Chloride  1,000 ml @ 


 100 mls/hr  Q10H


 IV  6/19/18 18:40


    6/20/18 12:57


 


 


  (NS Flush)  2 ml  UNSCH  PRN


 IV FLUSH  6/19/18 18:45


     


 


 


  (NS Flush)  2 ml  BID


 IV FLUSH  6/19/18 21:00


    6/20/18 07:52


 


 


  (Narcan Inj)  0.4 mg  UNSCH  PRN


 IV PUSH  6/19/18 18:45


     


 


 


  (Milk Of


 Magnesia Liq)  30 ml  Q12H  PRN


 PO  6/19/18 18:45


     


 


 


  (Senokot)  17.2 mg  Q12H  PRN


 PO  6/19/18 18:45


     


 


 


  (Dulcolax Supp)  10 mg  DAILY  PRN


 RECTAL  6/19/18 18:45


     


 


 


  (Lactulose Liq)  30 ml  DAILY  PRN


 PO  6/19/18 18:45


     


 


 


 Pharmacy Profile


 Note  0 ml @ 0


 mls/hr  UNSCH


 OTHER  6/19/18 18:45


     


 


 


 Piperacillin Sod/


 Tazobactam Sod  50 ml @ 


 100 mls/hr  Q6H


 IV  6/19/18 23:00


    6/20/18 10:27


 


 


  ([facility]


 Ordered Lab Info)  SPECIFIC


 LAB TO BE


 ...  ONCE  ONCE


 .XX  6/20/18 16:45


 6/20/18 16:46   


 


 


  (Habitrol 14 Mg


 Patch.24 Hr)  1 patch  DAILY


 T-DERMAL  6/21/18 09:00


     


 


 


  (NS Flush)  2 ml  UNSCH  PRN


 IV FLUSH  6/20/18 12:15


     


 


 


  (NS Flush)  2 ml  BID


 IV FLUSH  6/20/18 21:00


     


 


 


  (Tylenol)  650 mg  Q4H  PRN


 PO  6/20/18 12:15


     


 


 


  (Zofran  Odt)  4 mg  Q6H  PRN


 SL  6/20/18 12:15


     


 


 


  (Reglan Inj)  5 mg  Q6H  PRN


 IV PUSH  6/20/18 12:15


     


 


 


  (Lovenox Inj)  40 mg  Q24H


 SQ  6/20/18 13:00


    6/20/18 12:46


 


 


  (Tylenol)  650 mg  Q6H  PRN


 PO  6/20/18 12:15


     


 


 


  (Percocet  5-325


 Mg)  1 tab  Q6H  PRN


 PO  6/20/18 12:15


     


 


 


  (Percocet 


 Mg)  1 tab  Q6H  PRN


 PO  6/20/18 12:15


    6/20/18 12:46


 


 


  (Morphine Inj)  2 mg  Q3H  PRN


 IV PUSH  6/20/18 12:15


     


 


 


  (Morphine Inj)  4 mg  Q3H  PRN


 IV PUSH  6/20/18 12:15


     


 


 


  (Morphine Inj)  4 mg  Q3H  PRN


 IV PUSH  6/20/18 12:15


     


 


 


  (Narcan Inj)  0.4 mg  UNSCH  PRN


 IV PUSH  6/20/18 12:15


     


 


 


  (Ashely-Colace)  1 tab  BID


 PO  6/20/18 21:00


     


 


 


  (Milk Of


 Magnesia Liq)  30 ml  Q12H  PRN


 PO  6/20/18 12:15


     


 


 


  (Senokot)  17.2 mg  Q12H  PRN


 PO  6/20/18 12:15


     


 


 


  (Dulcolax Supp)  10 mg  DAILY  PRN


 RECTAL  6/20/18 12:15


     


 


 


  (Lactulose Liq)  30 ml  DAILY  PRN


 PO  6/20/18 12:15


     


 


 


 Miscellaneous


 Information  1  DAILY


 T-DERMAL  6/21/18 09:00


     


 








Family History


reviewed and NC to current ID problems


Social History


Smokes approximately 1 pack per day.  Occasional alcohol.  Positive marijuana.  

Admits to IVDA.


Works in a restaurant and also .





Physical Exam


Vital Signs





Vital Signs








  Date Time  Temp Pulse Resp B/P (MAP) Pulse Ox O2 Delivery O2 Flow Rate FiO2


 


6/20/18 07:49 98.6 73 16 112/59 (76) 98   


 


6/20/18 04:00 98.0 64 16 127/70 (89) 97   


 


6/20/18 00:16   18     


 


6/20/18 00:00 99.1 92 19 142/60 (87) 98   


 


6/19/18 20:40 100.0 88 18 131/65 (87) 97   


 


6/19/18 20:31        


 


6/19/18 18:16 99.4 89 16  98 Room Air  


 


6/19/18 16:45     100 Room Air  


 


6/19/18 16:45  89 18 120/56 (77) 100 Room Air  


 


6/19/18 16:45  89 18 120/56 (77) 100 Room Air  


 


6/19/18 16:00 99.9 103 20 123/58 (79) 98   








Physical Exam


GENERAL: This is a well-nourished, well-developed patient, in no apparent 

distress.


SKIN: No rashes, ecchymoses or lesions. Cool and dry.


HEAD: Atraumatic. Normocephalic. No temporal or scalp tenderness.


EYES: Pupils equal round and reactive. No scleral icterus. No injection or 

drainage. 


ENT: Nose without bleeding, purulent drainage or septal hematoma. Throat 

without erythema, tonsillar hypertrophy or exudate. Uvula midline. Airway 

patent.


NECK: Trachea midline. Supple, nontender, no meningeal signs.


CARDIOVASCULAR: HS audible.


RESPIRATORY: Clear to auscultation. Breath sounds equal bilaterally. 


GASTROINTESTINAL: Abdomen soft, non-tender, nondistended. 


MUSCULOSKELETAL: Right hand and forearm with swelling, erythema and induration. 

Unable to flex his fingers completely into a fist. Scratch marks noted.


On right elbow there was some erythema noted mild tenderness but no induration.


NEUROLOGICAL: Awake and alert. Non focal exam


Psych cooperative


IV line sites with no e.o infection


Laboratory





Laboratory Tests








Test


  6/19/18


17:00 6/20/18


07:10


 


White Blood Count 18.3  13.2 


 


Red Blood Count 4.66  4.45 


 


Hemoglobin 14.3  13.4 


 


Hematocrit 43.1  40.9 


 


Mean Corpuscular Volume 92.5  91.8 


 


Mean Corpuscular Hemoglobin 30.7  30.2 


 


Mean Corpuscular Hemoglobin


Concent 33.2 


  32.9 


 


 


Red Cell Distribution Width 14.1  14.1 


 


Platelet Count 199  185 


 


Mean Platelet Volume 7.8  8.2 


 


Neutrophils (%) (Auto) 88.7  83.3 


 


Lymphocytes (%) (Auto) 3.3  7.6 


 


Monocytes (%) (Auto) 7.9  8.2 


 


Eosinophils (%) (Auto) 0.0  0.7 


 


Basophils (%) (Auto) 0.1  0.2 


 


Neutrophils # (Auto) 16.3  11.0 


 


Lymphocytes # (Auto) 0.6  1.0 


 


Monocytes # (Auto) 1.4  1.1 


 


Eosinophils # (Auto) 0.0  0.1 


 


Basophils # (Auto) 0.0  0.0 


 


CBC Comment DIFF FINAL  DIFF FINAL 


 


Differential Comment    


 


Urine Color YELLOW  


 


Urine Turbidity CLEAR  


 


Urine pH 6.0  


 


Urine Specific Gravity 1.016  


 


Urine Protein NEG  


 


Urine Glucose (UA) NEG  


 


Urine Ketones TRACE  


 


Urine Occult Blood NEG  


 


Urine Nitrite NEG  


 


Urine Bilirubin NEG  


 


Urine Urobilinogen 2.0  


 


Urine Leukocyte Esterase NEG  


 


Urine RBC 3  


 


Urine WBC 1  


 


Urine Bacteria RARE  


 


Urine Hyaline Casts 4  


 


Urine Mucus FEW  


 


Microscopic Urinalysis Comment


  CATH-CULTURE


IND 


 


 


Blood Urea Nitrogen 7  6 


 


Creatinine 0.80  0.62 


 


Random Glucose 98  100 


 


Total Protein 7.0  5.9 


 


Albumin 3.9  3.1 


 


Calcium Level 8.9  8.6 


 


Alkaline Phosphatase 104  115 


 


Aspartate Amino Transf


(AST/SGOT) 18 


  22 


 


 


Alanine Aminotransferase


(ALT/SGPT) 23 


  42 


 


 


Total Bilirubin 0.5  0.8 


 


Sodium Level 137  141 


 


Potassium Level 3.4  3.8 


 


Chloride Level 103  111 


 


Carbon Dioxide Level 22.6  19.6 


 


Anion Gap 11  10 


 


Estimat Glomerular Filtration


Rate 116 


  156 


 


 


Lactic Acid Level 1.3  














 Date/Time


Source Procedure


Growth Status


 


 


 6/19/18 17:00


Blood Peripheral Aerobic Blood Culture - Preliminary


NO GROWTH IN 1 DAY Resulted


 


 6/19/18 17:00


Blood Peripheral Anaerobic Blood Culture - Preliminary


NO GROWTH IN 1 DAY Resulted


 


 6/19/18 17:00


Urine Catheterized Urine Urine Culture


Pending Received








Result Diagram:  


6/20/18 0710                                                                   

             6/20/18 0710








Assessment and Plan


Assessment and Plan


Right hand cellulitis, abscess ? tendinitis.


Right UE lymphangitis.


Denies IVDA.


Denies trauma or animal bite.





Recs


Continue Vanco IV 


Continue Continue Zosyn IV


Agree with MRI hand


Consult Hand surgery


Follow cultures


Follow clinically.


dw patient











Mary Reynoso MD Jun 20, 2018 13:07

## 2018-06-20 NOTE — HHI.PR
Subjective


Remarks


27-year-old male with no significant past medical history presents the 

emergency department for evaluation of swelling of his right hand.  The patient 

reports that approximately 3 days ago he burned himself on the oven at work.  

He states that since that time his hand has become more red and swollen and now 

has red streaks going up his arm towards his axilla.  He endorses subjective, 

severe fever/chills.  No chest pain or shortness of breath.  No cough.  No 

abdominal pain.  No nausea/vomiting/diarrhea.





6-20 COMPLAINS OF PAIN AND SWELLING IN RIGHT HAND


DW RN AND PT


ID CONSULT


MAY NEED HAND SURGERY CONSULT


PAIN CONTROL





Objective


Vitals





Vital Signs








  Date Time  Temp Pulse Resp B/P (MAP) Pulse Ox O2 Delivery O2 Flow Rate FiO2


 


6/20/18 07:49 98.6 73 16 112/59 (76) 98   


 


6/20/18 04:00 98.0 64 16 127/70 (89) 97   


 


6/20/18 00:16   18     


 


6/20/18 00:00 99.1 92 19 142/60 (87) 98   


 


6/19/18 20:40 100.0 88 18 131/65 (87) 97   


 


6/19/18 20:31        


 


6/19/18 18:16 99.4 89 16  98 Room Air  


 


6/19/18 16:45     100 Room Air  


 


6/19/18 16:45  89 18 120/56 (77) 100 Room Air  


 


6/19/18 16:45  89 18 120/56 (77) 100 Room Air  


 


6/19/18 16:00 99.9 103 20 123/58 (79) 98   














I/O      


 


 6/19/18 6/19/18 6/19/18 6/20/18 6/20/18 6/20/18





 07:00 15:00 23:00 07:00 15:00 23:00


 


Intake Total   2350 ml 420 ml  


 


Output Total    300 ml  


 


Balance   2350 ml 120 ml  


 


      


 


Intake Oral    420 ml  


 


IV Total   2350 ml   


 


Output Urine Total    300 ml  


 


# Bowel Movements    1  








Result Diagram:  


6/20/18 0710                                                                   

             6/20/18 0710





Other Results





 Laboratory Tests








Test


  6/19/18


17:00 6/20/18


07:10


 


White Blood Count 18.3 TH/MM3  13.2 TH/MM3 


 


Red Blood Count 4.66 MIL/MM3  4.45 MIL/MM3 


 


Hemoglobin 14.3 GM/DL  13.4 GM/DL 


 


Hematocrit 43.1 %  40.9 % 


 


Mean Corpuscular Volume 92.5 FL  91.8 FL 


 


Mean Corpuscular Hemoglobin 30.7 PG  30.2 PG 


 


Mean Corpuscular Hemoglobin


Concent 33.2 % 


  32.9 % 


 


 


Red Cell Distribution Width 14.1 %  14.1 % 


 


Platelet Count 199 TH/MM3  185 TH/MM3 


 


Mean Platelet Volume 7.8 FL  8.2 FL 


 


Neutrophils (%) (Auto) 88.7 %  83.3 % 


 


Lymphocytes (%) (Auto) 3.3 %  7.6 % 


 


Monocytes (%) (Auto) 7.9 %  8.2 % 


 


Eosinophils (%) (Auto) 0.0 %  0.7 % 


 


Basophils (%) (Auto) 0.1 %  0.2 % 


 


Neutrophils # (Auto) 16.3 TH/MM3  11.0 TH/MM3 


 


Lymphocytes # (Auto) 0.6 TH/MM3  1.0 TH/MM3 


 


Monocytes # (Auto) 1.4 TH/MM3  1.1 TH/MM3 


 


Eosinophils # (Auto) 0.0 TH/MM3  0.1 TH/MM3 


 


Basophils # (Auto) 0.0 TH/MM3  0.0 TH/MM3 


 


CBC Comment DIFF FINAL  DIFF FINAL 


 


Differential Comment    


 


Urine Color YELLOW  


 


Urine Turbidity CLEAR  


 


Urine pH 6.0  


 


Urine Specific Gravity 1.016  


 


Urine Protein NEG mg/dL  


 


Urine Glucose (UA) NEG mg/dL  


 


Urine Ketones TRACE mg/dL  


 


Urine Occult Blood NEG  


 


Urine Nitrite NEG  


 


Urine Bilirubin NEG  


 


Urine Urobilinogen 2.0 mg/dL  


 


Urine Leukocyte Esterase NEG  


 


Urine RBC 3 /hpf  


 


Urine WBC 1 /hpf  


 


Urine Bacteria RARE /hpf  


 


Urine Hyaline Casts 4 /lpf  


 


Urine Mucus FEW /lpf  


 


Microscopic Urinalysis Comment


  CATH-CULTURE


IND 


 


 


Blood Urea Nitrogen 7 MG/DL  6 MG/DL 


 


Creatinine 0.80 MG/DL  0.62 MG/DL 


 


Random Glucose 98 MG/DL  100 MG/DL 


 


Total Protein 7.0 GM/DL  5.9 GM/DL 


 


Albumin 3.9 GM/DL  3.1 GM/DL 


 


Calcium Level 8.9 MG/DL  8.6 MG/DL 


 


Alkaline Phosphatase 104 U/L  115 U/L 


 


Aspartate Amino Transf


(AST/SGOT) 18 U/L 


  22 U/L 


 


 


Alanine Aminotransferase


(ALT/SGPT) 23 U/L 


  42 U/L 


 


 


Total Bilirubin 0.5 MG/DL  0.8 MG/DL 


 


Sodium Level 137 MEQ/L  141 MEQ/L 


 


Potassium Level 3.4 MEQ/L  3.8 MEQ/L 


 


Chloride Level 103 MEQ/L  111 MEQ/L 


 


Carbon Dioxide Level 22.6 MEQ/L  19.6 MEQ/L 


 


Anion Gap 11 MEQ/L  10 MEQ/L 


 


Estimat Glomerular Filtration


Rate 116 ML/MIN 


  156 ML/MIN 


 


 


Lactic Acid Level 1.3 mmol/L  








Objective Remarks


GENERAL: Awake alert and oriented 3 talkative and cooperative


SKIN: Warm and dry.  Tenderness to right hand with swelling


HEAD: Atraumatic. Normocephalic. 


EYES: Pupils equal and round. No scleral icterus. No injection or drainage.  

Extraocular muscles intact


ENT: No nasal bleeding or discharge.  Mucous membranes pink and moist.  Tongue 

is midline


NECK: Trachea midline. No JVD.  Supple


CARDIOVASCULAR: Regular rate and rhythm.  S1-S2 no S3 or S4


RESPIRATORY: No accessory muscle use. Clear to auscultation. Breath sounds 

equal bilaterally. 


GASTROINTESTINAL: Abdomen soft, non-tender, nondistended. Hepatic and splenic 

margins not palpable. 


MUSCULOSKELETAL: Extremities without clubbing, cyanosis, or edema. No obvious 

deformities. 


NEUROLOGICAL: Awake and alert. No obvious cranial nerve deficits.  Motor 

grossly within normal limits. Five out of 5 muscle strength in the arms and 

legs.  Normal speech.  Right hand is quite swollen tender decreased range of 

motion


PSYCHIATRIC: Appropriate mood and affect; insight and judgment normal.


Medications and IVs





Current Medications


Acetaminophen (Tylenol) 650 mg ONCE  ONCE PO  Last administered on 6/19/18at 17:

05;  Start 6/19/18 at 16:30;  Stop 6/19/18 at 16:31;  Status DC


Piperacillin Sod/ Tazobactam Sod 100 ml @  200 mls/hr ONCE  STAT IV  Last 

administered on 6/19/18at 17:04;  Start 6/19/18 at 16:24;  Stop 6/19/18 at 16:53

;  Status DC


Vancomycin HCl 1000 mg/Sodium Chloride 250 ml @  250 mls/hr ONCE  STAT IV  Last 

administered on 6/19/18at 17:05;  Start 6/19/18 at 16:24;  Stop 6/19/18 at 17:23

;  Status DC


Sodium Chloride 1,000 ml @  1,000 mls/hr Q1H ONCE IV  Last administered on 6/19/ 18at 17:03;  Start 6/19/18 at 16:24;  Stop 6/19/18 at 17:23;  Status DC


Sodium Chloride 1,000 ml @  1,000 mls/hr Q1H ONCE IV  Last administered on 6/19/ 18at 17:06;  Start 6/19/18 at 16:24;  Stop 6/19/18 at 17:23;  Status DC


Sodium Chloride 1,000 ml @  100 mls/hr Q10H IV  Last administered on 6/20/18at 

07:52;  Start 6/19/18 at 18:40


Sodium Chloride (NS Flush) 2 ml UNSCH  PRN IV FLUSH FLUSH AFTER USING IV ACCESS

;  Start 6/19/18 at 18:45


Sodium Chloride (NS Flush) 2 ml BID IV FLUSH  Last administered on 6/20/18at 07:

52;  Start 6/19/18 at 21:00


Naloxone HCl (Narcan Inj) 0.4 mg UNSCH  PRN IV PUSH SEE LABEL COMMENTS;  Start 6 /19/18 at 18:45


Magnesium Hydroxide (Milk Of Magnesia Liq) 30 ml Q12H  PRN PO Mild constipation

;  Start 6/19/18 at 18:45


Sennosides (Senokot) 17.2 mg Q12H  PRN PO Moderate constipation;  Start 6/19/18 

at 18:45


Bisacodyl (Dulcolax Supp) 10 mg DAILY  PRN RECTAL SEVERE CONSITIPATION;  Start 6 /19/18 at 18:45


Lactulose (Lactulose Liq) 30 ml DAILY  PRN PO SEVERE CONSITIPATION;  Start 6/19/ 18 at 18:45


Pharmacy Profile Note 0 ml @ 0 mls/hr UNSCH OTHER ;  Start 6/19/18 at 18:45


Piperacillin Sod/ Tazobactam Sod 50 ml @  100 mls/hr Q6H IV  Last administered 

on 6/20/18at 10:27;  Start 6/19/18 at 23:00


Oxycodone/ Acetaminophen (Percocet  5-325 Mg) 1 tab Q4H  PRN PO pain 6-10 Last 

administered on 6/20/18at 07:51;  Start 6/19/18 at 20:00


Potassium Chloride (KCl) 40 meq ONCE  ONCE PO  Last administered on 6/19/18at 23

:20;  Start 6/19/18 at 20:00;  Stop 6/19/18 at 21:29;  Status DC


Vancomycin HCl 1000 mg/Sodium Chloride 250 ml @  250 mls/hr ONCE  ONCE IV  Last 

administered on 6/20/18at 00:16;  Start 6/20/18 at 01:00;  Stop 6/20/18 at 01:59

;  Status DC


Vancomycin HCl 1000 mg/Sodium Chloride 250 ml @  250 mls/hr ONCE  ONCE IV  Last 

administered on 6/20/18at 07:51;  Start 6/20/18 at 09:00;  Stop 6/20/18 at 09:59

;  Status DC


Miscellaneous Information ([facility] Ordered Lab Info) SPECIFIC LAB TO BE 

LISE... ONCE  ONCE .XX ;  Start 6/20/18 at 16:45;  Stop 6/20/18 at 16:46





A/P


Assessment and Plan


1.  Cellulitis/lymphangitis/sepsis/burn right hand


Blood cultures pending


Vancomycin/Zosyn


Infectious disease consulted, appreciate recommendations


We will get MRI of right hand


May need hand surgery in the future





2.  Hypokalemia


Patient with mild hypokalemia


Status post p.o. supplementation


Monitor BMP


History of obsessive-compulsive disorder





Tobacco abuse continue on NicoDerm patch





FEN


Regular diet


Electrolytes: As above


NS at 100 cc/hour


Discharge Planning


Pending improvement and infectious disease input may need hand surgery











Chino Copeland DO Jun 20, 2018 12:08

## 2018-06-20 NOTE — RADRPT
EXAM DATE:  6/20/2018 2:46 PM EDT

AGE/SEX:        27 years / Male



INDICATIONS:    .  Cellulitis.  Swollen right hand.



CLINICAL DATA:  This is the patient's subsequent encounter. Patient reports that signs and symptoms h
ave been present for 3 days and indicates a pain score of 7/10. 

                                                                          

MEDICAL/SURGICAL HISTORY:       None. Appendectomy. Cryoablation of heart.



COMPARISON:      No prior exams available for comparison. 





TECHNIQUE:  Multiplanar, multisequence MRI examination was performed without contrast and after the i
ntravenous administration of 12 ml Omniscan (gadodiamide) contrast as a single exam dose.







FINDINGS:  

Marrow:  There is homogeneous signal in the marrow of the visualized bones of the hand.

Metacarpal-Phalangeal Joints:  The MCP joints are unremarkable with no evidence of erosion, effusion,
 or malalignment.

Interphalangeal Joints:  The PIP and DIP joints are unremarkable with no evidence of erosion, effusio
n or malalignment.

Extensor Tendons:  The visualized extensor tendons are intact.

Flexor Tendons:  The visualized flexor tendons are intact.

Soft Tissues:  There is edema and enhancement seen throughout the dorsum of the hand. This mainly inv
olves the subcutaneous tissue but does extend around the extensor tendons. There is a crescentic foca
l multilocular fluid collection likely related to an abscess around the dorsal aspect of the thumb be
ginning at the proximal first metacarpal level and extending to the MCP joint level. This is in the d
orsal superficial soft tissues superficial to the tendons. It measures approximate 2.6 cm in length, 
2.4 cm in greatest transverse dimension, but only up to 3 mm in thickness.



CONCLUSION: 

Edema and enhancement throughout the dorsal soft tissues of the hand. There is a crescentic multilocu
lated  fluid collection of the long the dorsal superficial soft tissues of the thumb extending along 
the length of the first metacarpal likely related to an abscess.





Electronically signed by: Wilmer Martel MD  6/20/2018 3:24 PM EDT

## 2018-06-21 VITALS
HEART RATE: 78 BPM | RESPIRATION RATE: 20 BRPM | TEMPERATURE: 98.1 F | OXYGEN SATURATION: 98 % | DIASTOLIC BLOOD PRESSURE: 60 MMHG | SYSTOLIC BLOOD PRESSURE: 115 MMHG

## 2018-06-21 VITALS — OXYGEN SATURATION: 99 %

## 2018-06-21 VITALS
HEART RATE: 59 BPM | DIASTOLIC BLOOD PRESSURE: 69 MMHG | RESPIRATION RATE: 20 BRPM | OXYGEN SATURATION: 99 % | TEMPERATURE: 98.8 F | SYSTOLIC BLOOD PRESSURE: 118 MMHG

## 2018-06-21 VITALS
DIASTOLIC BLOOD PRESSURE: 57 MMHG | SYSTOLIC BLOOD PRESSURE: 117 MMHG | RESPIRATION RATE: 16 BRPM | OXYGEN SATURATION: 100 % | HEART RATE: 67 BPM | TEMPERATURE: 98.1 F

## 2018-06-21 VITALS
TEMPERATURE: 98.3 F | OXYGEN SATURATION: 98 % | RESPIRATION RATE: 20 BRPM | DIASTOLIC BLOOD PRESSURE: 68 MMHG | HEART RATE: 66 BPM | SYSTOLIC BLOOD PRESSURE: 122 MMHG

## 2018-06-21 VITALS
RESPIRATION RATE: 16 BRPM | HEART RATE: 78 BPM | SYSTOLIC BLOOD PRESSURE: 123 MMHG | DIASTOLIC BLOOD PRESSURE: 58 MMHG | TEMPERATURE: 98.4 F | OXYGEN SATURATION: 98 %

## 2018-06-21 LAB
ALBUMIN SERPL-MCNC: 2.8 GM/DL (ref 3.4–5)
ALP SERPL-CCNC: 98 U/L (ref 45–117)
ALT SERPL-CCNC: 30 U/L (ref 12–78)
AST SERPL-CCNC: 11 U/L (ref 15–37)
BASOPHILS # BLD AUTO: 0 TH/MM3 (ref 0–0.2)
BASOPHILS NFR BLD: 0.2 % (ref 0–2)
BILIRUB SERPL-MCNC: 0.5 MG/DL (ref 0.2–1)
BUN SERPL-MCNC: 4 MG/DL (ref 7–18)
CALCIUM SERPL-MCNC: 8.4 MG/DL (ref 8.5–10.1)
CHLORIDE SERPL-SCNC: 110 MEQ/L (ref 98–107)
CREAT SERPL-MCNC: 0.57 MG/DL (ref 0.6–1.3)
EOSINOPHIL # BLD: 0.1 TH/MM3 (ref 0–0.4)
EOSINOPHIL NFR BLD: 1.5 % (ref 0–4)
ERYTHROCYTE [DISTWIDTH] IN BLOOD BY AUTOMATED COUNT: 13.8 % (ref 11.6–17.2)
GFR SERPLBLD BASED ON 1.73 SQ M-ARVRAT: 171 ML/MIN (ref 89–?)
GLUCOSE SERPL-MCNC: 93 MG/DL (ref 74–106)
HBA1C MFR BLD: 5.2 % (ref 4.3–6)
HCO3 BLD-SCNC: 21.7 MEQ/L (ref 21–32)
HCT VFR BLD CALC: 37.2 % (ref 39–51)
HGB BLD-MCNC: 12.5 GM/DL (ref 13–17)
LYMPHOCYTES # BLD AUTO: 1.3 TH/MM3 (ref 1–4.8)
LYMPHOCYTES NFR BLD AUTO: 13.6 % (ref 9–44)
MAGNESIUM SERPL-MCNC: 2 MG/DL (ref 1.5–2.5)
MCH RBC QN AUTO: 31.1 PG (ref 27–34)
MCHC RBC AUTO-ENTMCNC: 33.7 % (ref 32–36)
MCV RBC AUTO: 92.3 FL (ref 80–100)
MONOCYTE #: 0.8 TH/MM3 (ref 0–0.9)
MONOCYTES NFR BLD: 8.8 % (ref 0–8)
NEUTROPHILS # BLD AUTO: 7.4 TH/MM3 (ref 1.8–7.7)
NEUTROPHILS NFR BLD AUTO: 75.9 % (ref 16–70)
PHOSPHATE SERPL-MCNC: 2.3 MG/DL (ref 2.5–4.9)
PLATELET # BLD: 175 TH/MM3 (ref 150–450)
PMV BLD AUTO: 8.1 FL (ref 7–11)
PROT SERPL-MCNC: 5.9 GM/DL (ref 6.4–8.2)
RBC # BLD AUTO: 4.02 MIL/MM3 (ref 4.5–5.9)
SODIUM SERPL-SCNC: 141 MEQ/L (ref 136–145)
T4 FREE SERPL-MCNC: 1.03 NG/DL (ref 0.76–1.46)
WBC # BLD AUTO: 9.7 TH/MM3 (ref 4–11)

## 2018-06-21 RX ADMIN — OXYCODONE HYDROCHLORIDE AND ACETAMINOPHEN PRN TAB: 10; 325 TABLET ORAL at 00:08

## 2018-06-21 RX ADMIN — SODIUM CHLORIDE SCH MLS/HR: 900 INJECTION INTRAVENOUS at 14:34

## 2018-06-21 RX ADMIN — OXYCODONE HYDROCHLORIDE AND ACETAMINOPHEN PRN TAB: 10; 325 TABLET ORAL at 06:57

## 2018-06-21 RX ADMIN — TAZOBACTAM SODIUM AND PIPERACILLIN SODIUM SCH MLS/HR: 375; 3 INJECTION, SOLUTION INTRAVENOUS at 11:21

## 2018-06-21 RX ADMIN — SODIUM CHLORIDE SCH MLS/HR: 900 INJECTION INTRAVENOUS at 06:24

## 2018-06-21 RX ADMIN — ENOXAPARIN SODIUM SCH MG: 40 INJECTION SUBCUTANEOUS at 14:34

## 2018-06-21 RX ADMIN — TAZOBACTAM SODIUM AND PIPERACILLIN SODIUM SCH MLS/HR: 375; 3 INJECTION, SOLUTION INTRAVENOUS at 06:22

## 2018-06-21 RX ADMIN — ENOXAPARIN SODIUM SCH MG: 40 INJECTION SUBCUTANEOUS at 13:00

## 2018-06-21 RX ADMIN — TAZOBACTAM SODIUM AND PIPERACILLIN SODIUM SCH MLS/HR: 375; 3 INJECTION, SOLUTION INTRAVENOUS at 17:21

## 2018-06-21 RX ADMIN — Medication SCH ML: at 09:00

## 2018-06-21 RX ADMIN — MORPHINE SULFATE PRN MG: 2 INJECTION, SOLUTION INTRAMUSCULAR; INTRAVENOUS at 09:26

## 2018-06-21 RX ADMIN — PHENYTOIN SODIUM SCH MLS/HR: 50 INJECTION INTRAMUSCULAR; INTRAVENOUS at 10:40

## 2018-06-21 RX ADMIN — OXYCODONE HYDROCHLORIDE AND ACETAMINOPHEN PRN TAB: 10; 325 TABLET ORAL at 15:17

## 2018-06-21 RX ADMIN — STANDARDIZED SENNA CONCENTRATE AND DOCUSATE SODIUM SCH TAB: 8.6; 5 TABLET, FILM COATED ORAL at 09:00

## 2018-06-21 NOTE — HHI.PR
Subjective


Remarks


27-year-old male with no significant past medical history presents the 

emergency department for evaluation of swelling of his right hand.  The patient 

reports that approximately 3 days ago he burned himself on the oven at work.  

He states that since that time his hand has become more red and swollen and now 

has red streaks going up his arm towards his axilla.  He endorses subjective, 

severe fever/chills.  No chest pain or shortness of breath.  No cough.  No 

abdominal pain.  No nausea/vomiting/diarrhea.





6-20 COMPLAINS OF PAIN AND SWELLING IN RIGHT HAND


DW RN AND PT


ID CONSULT


MAY NEED HAND SURGERY CONSULT


PAIN CONTROL





6-21 SLOW IMPROVEMENT


USING KRATOM AT HOME


RIGHT HAND IS IMPROVING


DW RN AND PT


DW DR OSMAN





Objective


Vitals





Vital Signs








  Date Time  Temp Pulse Resp B/P (MAP) Pulse Ox O2 Delivery O2 Flow Rate FiO2


 


6/21/18 10:54        


 


6/21/18 10:46     99   21


 


6/21/18 08:00 98.8 59 20 118/69 (85) 99   


 


6/21/18 05:05 98.1 67 16 117/57 (77) 100   


 


6/21/18 00:16 98.4 78 16 123/58 (79) 98   


 


6/20/18 21:40 99.7 82 16 118/66 (83) 99   


 


6/20/18 21:38     98   


 


6/20/18 12:40 98.1 82 20 115/57 (76) 98   














I/O      


 


 6/20/18 6/20/18 6/20/18 6/21/18 6/21/18 6/21/18





 07:00 15:00 23:00 07:00 15:00 23:00


 


Intake Total 420 ml  960 ml 1080 ml  


 


Output Total 300 ml  1600 ml 1850 ml  


 


Balance 120 ml  -640 ml -770 ml  


 


      


 


Intake Oral 420 ml  960 ml 1080 ml  


 


Output Urine Total 300 ml  1600 ml 1850 ml  


 


# Bowel Movements 1   0  








Result Diagram:  


6/21/18 0804                                                                   

             6/21/18 0804





Other Results





 Laboratory Tests








Test


  6/19/18


17:00 6/20/18


07:10 6/20/18


14:54 6/20/18


16:45


 


White Blood Count 18.3 TH/MM3  13.2 TH/MM3   


 


Red Blood Count 4.66 MIL/MM3  4.45 MIL/MM3   


 


Hemoglobin 14.3 GM/DL  13.4 GM/DL   


 


Hematocrit 43.1 %  40.9 %   


 


Mean Corpuscular Volume 92.5 FL  91.8 FL   


 


Mean Corpuscular Hemoglobin 30.7 PG  30.2 PG   


 


Mean Corpuscular Hemoglobin


Concent 33.2 % 


  32.9 % 


  


  


 


 


Red Cell Distribution Width 14.1 %  14.1 %   


 


Platelet Count 199 TH/MM3  185 TH/MM3   


 


Mean Platelet Volume 7.8 FL  8.2 FL   


 


Neutrophils (%) (Auto) 88.7 %  83.3 %   


 


Lymphocytes (%) (Auto) 3.3 %  7.6 %   


 


Monocytes (%) (Auto) 7.9 %  8.2 %   


 


Eosinophils (%) (Auto) 0.0 %  0.7 %   


 


Basophils (%) (Auto) 0.1 %  0.2 %   


 


Neutrophils # (Auto) 16.3 TH/MM3  11.0 TH/MM3   


 


Lymphocytes # (Auto) 0.6 TH/MM3  1.0 TH/MM3   


 


Monocytes # (Auto) 1.4 TH/MM3  1.1 TH/MM3   


 


Eosinophils # (Auto) 0.0 TH/MM3  0.1 TH/MM3   


 


Basophils # (Auto) 0.0 TH/MM3  0.0 TH/MM3   


 


CBC Comment DIFF FINAL  DIFF FINAL   


 


Differential Comment      


 


Urine Color YELLOW    


 


Urine Turbidity CLEAR    


 


Urine pH 6.0    


 


Urine Specific Gravity 1.016    


 


Urine Protein NEG mg/dL    


 


Urine Glucose (UA) NEG mg/dL    


 


Urine Ketones TRACE mg/dL    


 


Urine Occult Blood NEG    


 


Urine Nitrite NEG    


 


Urine Bilirubin NEG    


 


Urine Urobilinogen 2.0 mg/dL    


 


Urine Leukocyte Esterase NEG    


 


Urine RBC 3 /hpf    


 


Urine WBC 1 /hpf    


 


Urine Bacteria RARE /hpf    


 


Urine Hyaline Casts 4 /lpf    


 


Urine Mucus FEW /lpf    


 


Microscopic Urinalysis Comment


  CATH-CULTURE


IND 


  


  


 


 


Blood Urea Nitrogen 7 MG/DL  6 MG/DL   


 


Creatinine 0.80 MG/DL  0.62 MG/DL   


 


Random Glucose 98 MG/DL  100 MG/DL   


 


Total Protein 7.0 GM/DL  5.9 GM/DL   


 


Albumin 3.9 GM/DL  3.1 GM/DL   


 


Calcium Level 8.9 MG/DL  8.6 MG/DL   


 


Alkaline Phosphatase 104 U/L  115 U/L   


 


Aspartate Amino Transf


(AST/SGOT) 18 U/L 


  22 U/L 


  


  


 


 


Alanine Aminotransferase


(ALT/SGPT) 23 U/L 


  42 U/L 


  


  


 


 


Total Bilirubin 0.5 MG/DL  0.8 MG/DL   


 


Sodium Level 137 MEQ/L  141 MEQ/L   


 


Potassium Level 3.4 MEQ/L  3.8 MEQ/L   


 


Chloride Level 103 MEQ/L  111 MEQ/L   


 


Carbon Dioxide Level 22.6 MEQ/L  19.6 MEQ/L   


 


Anion Gap 11 MEQ/L  10 MEQ/L   


 


Estimat Glomerular Filtration


Rate 116 ML/MIN 


  156 ML/MIN 


  


  


 


 


Lactic Acid Level 1.3 mmol/L    


 


Urine Opiates Screen   NEG  


 


Urine Barbiturates Screen   NEG  


 


Urine Amphetamines Screen   NEG  


 


Urine Benzodiazepines Screen   NEG  


 


Urine Cocaine Screen   NEG  


 


Urine Cannabinoids Screen   POS  


 


Vancomycin Level Trough    4.9 MCG/ML 


 


Test


  6/21/18


08:04 


  


  


 


 


White Blood Count 9.7 TH/MM3    


 


Red Blood Count 4.02 MIL/MM3    


 


Hemoglobin 12.5 GM/DL    


 


Hematocrit 37.2 %    


 


Mean Corpuscular Volume 92.3 FL    


 


Mean Corpuscular Hemoglobin 31.1 PG    


 


Mean Corpuscular Hemoglobin


Concent 33.7 % 


  


  


  


 


 


Red Cell Distribution Width 13.8 %    


 


Platelet Count 175 TH/MM3    


 


Mean Platelet Volume 8.1 FL    


 


Neutrophils (%) (Auto) 75.9 %    


 


Lymphocytes (%) (Auto) 13.6 %    


 


Monocytes (%) (Auto) 8.8 %    


 


Eosinophils (%) (Auto) 1.5 %    


 


Basophils (%) (Auto) 0.2 %    


 


Neutrophils # (Auto) 7.4 TH/MM3    


 


Lymphocytes # (Auto) 1.3 TH/MM3    


 


Monocytes # (Auto) 0.8 TH/MM3    


 


Eosinophils # (Auto) 0.1 TH/MM3    


 


Basophils # (Auto) 0.0 TH/MM3    


 


CBC Comment DIFF FINAL    


 


Differential Comment     


 


Blood Urea Nitrogen 4 MG/DL    


 


Creatinine 0.57 MG/DL    


 


Random Glucose 93 MG/DL    


 


Total Protein 5.9 GM/DL    


 


Albumin 2.8 GM/DL    


 


Calcium Level 8.4 MG/DL    


 


Phosphorus Level 2.3 MG/DL    


 


Magnesium Level 2.0 MG/DL    


 


Alkaline Phosphatase 98 U/L    


 


Aspartate Amino Transf


(AST/SGOT) 11 U/L 


  


  


  


 


 


Alanine Aminotransferase


(ALT/SGPT) 30 U/L 


  


  


  


 


 


Total Bilirubin 0.5 MG/DL    


 


Sodium Level 141 MEQ/L    


 


Potassium Level 3.5 MEQ/L    


 


Chloride Level 110 MEQ/L    


 


Carbon Dioxide Level 21.7 MEQ/L    


 


Anion Gap 9 MEQ/L    


 


Estimat Glomerular Filtration


Rate 171 ML/MIN 


  


  


  


 


 


Free Thyroxine 1.03 NG/DL    


 


Thyroid Stimulating Hormone


3rd Gen 0.991 uIU/ML 


  


  


  


 








Imaging





Last Impressions








Hand MRI 6/20/18 0000 Signed





Impressions: 





 CONCLUSION: 





 Edema and enhancement throughout the dorsal soft tissues of the hand. There is 





 a crescentic multiloculated  fluid collection of the long the dorsal superficia





 l soft tissues of the thumb extending along the length of the first metacarpal 





 likely related to an abscess.





  





 





  





 








Objective Remarks


GENERAL: Awake alert and oriented 3 talkative and cooperative


SKIN: Warm and dry.  Tenderness to right hand with swelling RIGHT HAND LESS 

SWELLING- MOVING BETTER


HEAD: Atraumatic. Normocephalic. 


EYES: Pupils equal and round. No scleral icterus. No injection or drainage.  

Extraocular muscles intact


ENT: No nasal bleeding or discharge.  Mucous membranes pink and moist.  Tongue 

is midline


NECK: Trachea midline. No JVD.  Supple


CARDIOVASCULAR: Regular rate and rhythm.  S1-S2 no S3 or S4


RESPIRATORY: No accessory muscle use. Clear to auscultation. Breath sounds 

equal bilaterally. 


GASTROINTESTINAL: Abdomen soft, non-tender, nondistended. Hepatic and splenic 

margins not palpable. 


MUSCULOSKELETAL: Extremities without clubbing, cyanosis, or edema. No obvious 

deformities. 


NEUROLOGICAL: Awake and alert. No obvious cranial nerve deficits.  Motor 

grossly within normal limits. Five out of 5 muscle strength in the arms and 

legs.  Normal speech.  Right hand is LESS swollen, LESS tender, decreased range 

of motion HAS IMPROVED- CAN ALMOST CLOSE HAND NOW


PSYCHIATRIC: Appropriate mood and affect; insight and judgment normal.


Medications and IVs





Current Medications


Acetaminophen (Tylenol) 650 mg ONCE  ONCE PO  Last administered on 6/19/18at 17:

05;  Start 6/19/18 at 16:30;  Stop 6/19/18 at 16:31;  Status DC


Piperacillin Sod/ Tazobactam Sod 100 ml @  200 mls/hr ONCE  STAT IV  Last 

administered on 6/19/18at 17:04;  Start 6/19/18 at 16:24;  Stop 6/19/18 at 16:53

;  Status DC


Vancomycin HCl 1000 mg/Sodium Chloride 250 ml @  250 mls/hr ONCE  STAT IV  Last 

administered on 6/19/18at 17:05;  Start 6/19/18 at 16:24;  Stop 6/19/18 at 17:23

;  Status DC


Sodium Chloride 1,000 ml @  1,000 mls/hr Q1H ONCE IV  Last administered on 6/19/ 18at 17:03;  Start 6/19/18 at 16:24;  Stop 6/19/18 at 17:23;  Status DC


Sodium Chloride 1,000 ml @  1,000 mls/hr Q1H ONCE IV  Last administered on 6/19/ 18at 17:06;  Start 6/19/18 at 16:24;  Stop 6/19/18 at 17:23;  Status DC


Sodium Chloride 1,000 ml @  100 mls/hr Q10H IV  Last administered on 6/20/18at 

12:57;  Start 6/19/18 at 18:40


Sodium Chloride (NS Flush) 2 ml UNSCH  PRN IV FLUSH FLUSH AFTER USING IV ACCESS 

Last administered on 6/20/18at 22:07;  Start 6/19/18 at 18:45


Sodium Chloride (NS Flush) 2 ml BID IV FLUSH  Last administered on 6/20/18at 07:

52;  Start 6/19/18 at 21:00


Naloxone HCl (Narcan Inj) 0.4 mg UNSCH  PRN IV PUSH SEE LABEL COMMENTS;  Start 6 /19/18 at 18:45


Magnesium Hydroxide (Milk Of Magnesia Liq) 30 ml Q12H  PRN PO Mild constipation

;  Start 6/19/18 at 18:45


Sennosides (Senokot) 17.2 mg Q12H  PRN PO Moderate constipation;  Start 6/19/18 

at 18:45


Bisacodyl (Dulcolax Supp) 10 mg DAILY  PRN RECTAL SEVERE CONSITIPATION;  Start 6 /19/18 at 18:45


Lactulose (Lactulose Liq) 30 ml DAILY  PRN PO SEVERE CONSITIPATION;  Start 6/19/ 18 at 18:45


Pharmacy Profile Note 0 ml @ 0 mls/hr UNSCH OTHER ;  Start 6/19/18 at 18:45


Piperacillin Sod/ Tazobactam Sod 50 ml @  100 mls/hr Q6H IV  Last administered 

on 6/21/18at 11:21;  Start 6/19/18 at 23:00


Oxycodone/ Acetaminophen (Percocet  5-325 Mg) 1 tab Q4H  PRN PO pain 6-10 Last 

administered on 6/20/18at 07:51;  Start 6/19/18 at 20:00;  Stop 6/20/18 at 12:12

;  Status DC


Potassium Chloride (KCl) 40 meq ONCE  ONCE PO  Last administered on 6/19/18at 23

:20;  Start 6/19/18 at 20:00;  Stop 6/19/18 at 21:29;  Status DC


Vancomycin HCl 1000 mg/Sodium Chloride 250 ml @  250 mls/hr ONCE  ONCE IV  Last 

administered on 6/20/18at 00:16;  Start 6/20/18 at 01:00;  Stop 6/20/18 at 01:59

;  Status DC


Vancomycin HCl 1000 mg/Sodium Chloride 250 ml @  250 mls/hr ONCE  ONCE IV  Last 

administered on 6/20/18at 07:51;  Start 6/20/18 at 09:00;  Stop 6/20/18 at 09:59

;  Status DC


Miscellaneous Information ([facility] Ordered Lab Info) SPECIFIC LAB TO BE 

LISE... ONCE  ONCE .XX  Last administered on 6/20/18at 16:45;  Start 6/20/18 at 

16:45;  Stop 6/20/18 at 16:46;  Status DC


Nicotine (Habitrol 14 Mg Patch.24 Hr) 1 patch ONCE  ONCE T-DERMAL  Last 

administered on 6/20/18at 12:51;  Start 6/20/18 at 12:15;  Stop 6/20/18 at 12:41

;  Status DC


Nicotine (Habitrol 14 Mg Patch.24 Hr) 1 patch DAILY T-DERMAL  Last administered 

on 6/21/18at 09:25;  Start 6/21/18 at 09:00


Miscellaneous Information 1 DAILY T-DERMAL ;  Start 6/21/18 at 09:00;  Stop 6/21 /18 at 09:00;  Status DC


Sodium Chloride (NS Flush) 2 ml UNSCH  PRN IV FLUSH FLUSH AFTER USING IV ACCESS

;  Start 6/20/18 at 12:15


Sodium Chloride (NS Flush) 2 ml BID IV FLUSH ;  Start 6/20/18 at 21:00


Acetaminophen (Tylenol) 650 mg Q4H  PRN PO TEMP > 100.4;  Start 6/20/18 at 12:15


Ondansetron HCl (Zofran  Odt) 4 mg Q6H  PRN SL NAUSEA OR VOMITING;  Start 6/20/ 18 at 12:15


Metoclopramide HCl (Reglan Inj) 5 mg Q6H  PRN IV PUSH NAUSEA OR VOMITING;  

Start 6/20/18 at 12:15


Enoxaparin Sodium (Lovenox Inj) 40 mg Q24H SQ  Last administered on 6/20/18at 12

:46;  Start 6/20/18 at 13:00


Acetaminophen (Tylenol) 650 mg Q6H  PRN PO PAIN SCALE 1 TO 2;  Start 6/20/18 at 

12:15


Oxycodone/ Acetaminophen (Percocet  5-325 Mg) 1 tab Q6H  PRN PO PAIN SCALE 3 TO 

5;  Start 6/20/18 at 12:15


Oxycodone/ Acetaminophen (Percocet  Mg) 1 tab Q6H  PRN PO PAIN SCALE 6 TO 

10 Last administered on 6/21/18at 06:57;  Start 6/20/18 at 12:15


Morphine Sulfate (Morphine Inj) 2 mg Q3H  PRN IV PUSH Pain 3-5; if unable to 

take PO;  Start 6/20/18 at 12:15;  Stop 6/20/18 at 21:57;  Status DC


Morphine Sulfate (Morphine Inj) 4 mg Q3H  PRN IV PUSH Pain 6-10;if unable to 

take PO;  Start 6/20/18 at 12:15;  Stop 6/20/18 at 21:58;  Status DC


Morphine Sulfate (Morphine Inj) 4 mg Q3H  PRN IV PUSH BREAKTHROUGH PAIN;  Start 

6/20/18 at 12:15;  Stop 6/20/18 at 21:59;  Status DC


Naloxone HCl (Narcan Inj) 0.4 mg UNSCH  PRN IV PUSH SEE LABEL COMMENTS;  Start 6 /20/18 at 12:15


Senna/Docusate Sodium (Ashely-Colace) 1 tab BID PO ;  Start 6/20/18 at 21:00


Magnesium Hydroxide (Milk Of Magnesia Liq) 30 ml Q12H  PRN PO Mild constipation

;  Start 6/20/18 at 12:15


Sennosides (Senokot) 17.2 mg Q12H  PRN PO Moderate constipation;  Start 6/20/18 

at 12:15


Bisacodyl (Dulcolax Supp) 10 mg DAILY  PRN RECTAL SEVERE CONSITIPATION;  Start 6 /20/18 at 12:15


Lactulose (Lactulose Liq) 30 ml DAILY  PRN PO SEVERE CONSITIPATION;  Start 6/20/ 18 at 12:15


Miscellaneous Information 1 DAILY T-DERMAL  Last administered on 6/21/18at 09:00

;  Start 6/21/18 at 09:00


Gadodiamide (Omniscan Pf Inj) 12 ml STK-MED ONCE IVCONTRAST  Last administered 

on 6/20/18at 14:39;  Start 6/20/18 at 14:39;  Stop 6/20/18 at 14:40;  Status DC


Vancomycin HCl 1250 mg/Sodium Chloride 262.5 ml @  262.5 mls/ hr Q8H IV  Last 

administered on 6/21/18at 06:24;  Start 6/20/18 at 22:00


Miscellaneous Information ([facility] Ordered Lab Info) SPECIFIC LAB TO BE 

... ONCE  ONCE .XX ;  Start 6/21/18 at 21:45;  Stop 6/21/18 at 21:46


Morphine Sulfate (Morphine Inj) 2 mg Q3H  PRN IV PUSH Pain 3-5; if unable to 

take PO;  Start 6/20/18 at 22:00


Morphine Sulfate (Morphine Inj) 4 mg Q3H  PRN IV PUSH Pain 6-10;if unable to 

take PO;  Start 6/20/18 at 22:00


Morphine Sulfate (Morphine Inj) 4 mg Q3H  PRN IV PUSH BREAKTHROUGH PAIN Last 

administered on 6/21/18at 09:26;  Start 6/20/18 at 22:00





A/P


Assessment and Plan


1.  Cellulitis/lymphangitis/sepsis/burn right hand


Blood cultures pending


Vancomycin/Zosyn


Infectious disease consulted, appreciate recommendations


We will get MRI of right hand


WILL need hand surgery in the future


RIGHT HAND ABSCESS MAY NEED SURGICAL EVALUATION- ON ANTIBIOTICS





2.  Hypokalemia


Patient with mild hypokalemia


Status post p.o. supplementation


Monitor BMP


History of obsessive-compulsive disorder





Tobacco abuse continue on NicoDerm patch





HX OF KRATOM USE


FEN


Regular diet


Electrolytes: As above


NS at 100 cc/hour


Discharge Planning


Pending improvement and infectious disease input 


CONSULT HAND SURGERY











Chino Copeland DO Jun 21, 2018 12:47

## 2018-06-21 NOTE — HHI.IDPN
Subjective


Subjective


Allergies:  


Coded Allergies:  


     No Known Allergies (Verified  Allergy, Unknown, 6/19/18)





Objective


.





Vital Signs








  Date Time  Temp Pulse Resp B/P (MAP) Pulse Ox O2 Delivery O2 Flow Rate FiO2


 


6/21/18 14:52        


 


6/21/18 12:00 98.1 78 20 115/60 (78) 98   


 


6/21/18 10:54        


 


6/21/18 10:46     99   21


 


6/21/18 08:00 98.8 59 20 118/69 (85) 99   


 


6/21/18 05:05 98.1 67 16 117/57 (77) 100   


 


6/21/18 00:16 98.4 78 16 123/58 (79) 98   


 


6/20/18 21:40 99.7 82 16 118/66 (83) 99   


 


6/20/18 21:38     98   








.





Laboratory Tests








Test


  6/20/18


07:10 6/21/18


08:04


 


White Blood Count 13.2 TH/MM3  9.7 TH/MM3 


 


Red Blood Count 4.45 MIL/MM3  4.02 MIL/MM3 


 


Hemoglobin 13.4 GM/DL  12.5 GM/DL 


 


Hematocrit 40.9 %  37.2 % 


 


Mean Corpuscular Volume 91.8 FL  92.3 FL 


 


Mean Corpuscular Hemoglobin 30.2 PG  31.1 PG 


 


Mean Corpuscular Hemoglobin


Concent 32.9 % 


  33.7 % 


 


 


Red Cell Distribution Width 14.1 %  13.8 % 


 


Platelet Count 185 TH/MM3  175 TH/MM3 


 


Mean Platelet Volume 8.2 FL  8.1 FL 


 


Neutrophils (%) (Auto) 83.3 %  75.9 % 


 


Lymphocytes (%) (Auto) 7.6 %  13.6 % 


 


Monocytes (%) (Auto) 8.2 %  8.8 % 


 


Eosinophils (%) (Auto) 0.7 %  1.5 % 


 


Basophils (%) (Auto) 0.2 %  0.2 % 


 


Neutrophils # (Auto) 11.0 TH/MM3  7.4 TH/MM3 


 


Lymphocytes # (Auto) 1.0 TH/MM3  1.3 TH/MM3 


 


Monocytes # (Auto) 1.1 TH/MM3  0.8 TH/MM3 


 


Eosinophils # (Auto) 0.1 TH/MM3  0.1 TH/MM3 


 


Basophils # (Auto) 0.0 TH/MM3  0.0 TH/MM3 


 


CBC Comment DIFF FINAL  DIFF FINAL 


 


Differential Comment    








Laboratory Tests








Test


  6/20/18


07:10 6/21/18


08:04


 


Blood Urea Nitrogen 6 MG/DL  4 MG/DL 


 


Creatinine 0.62 MG/DL  0.57 MG/DL 


 


Random Glucose 100 MG/DL  93 MG/DL 


 


Total Protein 5.9 GM/DL  5.9 GM/DL 


 


Albumin 3.1 GM/DL  2.8 GM/DL 


 


Calcium Level 8.6 MG/DL  8.4 MG/DL 


 


Alkaline Phosphatase 115 U/L  98 U/L 


 


Aspartate Amino Transf


(AST/SGOT) 22 U/L 


  11 U/L 


 


 


Alanine Aminotransferase


(ALT/SGPT) 42 U/L 


  30 U/L 


 


 


Total Bilirubin 0.8 MG/DL  0.5 MG/DL 


 


Sodium Level 141 MEQ/L  141 MEQ/L 


 


Potassium Level 3.8 MEQ/L  3.5 MEQ/L 


 


Chloride Level 111 MEQ/L  110 MEQ/L 


 


Carbon Dioxide Level 19.6 MEQ/L  21.7 MEQ/L 


 


Anion Gap 10 MEQ/L  9 MEQ/L 


 


Estimat Glomerular Filtration


Rate 156 ML/MIN 


  171 ML/MIN 


 


 


Phosphorus Level  2.3 MG/DL 


 


Magnesium Level  2.0 MG/DL 


 


Hemoglobin A1c  5.2 % 


 


Free Thyroxine  1.03 NG/DL 


 


Thyroid Stimulating Hormone


3rd Gen 


  0.991 uIU/ML 


 








Microbiology








 Date/Time


Source Procedure


Growth Status


 


 


 6/19/18 17:00


Blood Peripheral Aerobic Blood Culture - Preliminary


NO GROWTH IN 2 DAYS Resulted


 


 6/19/18 17:00


Blood Peripheral Anaerobic Blood Culture - Preliminary


NO GROWTH IN 2 DAYS Resulted





 6/19/18 17:00


Blood Peripheral Aerobic Blood Culture - Preliminary


NO GROWTH IN 2 DAYS Resulted


 


 6/19/18 17:00


Blood Peripheral Anaerobic Blood Culture - Preliminary


NO GROWTH IN 2 DAYS Resulted


 


 6/19/18 17:00


Urine Catheterized Urine Urine Culture - Final


<10,000 CFU/ML MIXED GRAM POSITIVE FL... Complete

















Mary Reynoso MD Jun 21, 2018 17:55

## 2018-06-21 NOTE — MB
cc:

Shira Berger MD

****

 

 

DATE:

06/21/2018

 

DATE OF CONSULTATION:

06/21/2018.

 

REQUESTING PROVIDER:

The patient is being seen at the request of Dr. Russel Meier.

 

REASON FOR CONSULTATION:

Cellulitis and possible abscess of the right hand.

 

HISTORY OF PRESENT ILLNESS:

The patient is a 27-year-old male with no significant past medical 

history who presented to the emergency room on 06/19/2018.  The 

patient noted he had 3 days of swelling of his hand after he burned 

the dorsum of his hand at work.  The patient indicates that it had 

gotten red and swollen.  He did indicate that he was somehow was 

taking antibiotics, which did not appear to help.  On admission, his 

white count was 18.3 with a positive shift.  The patient has been on 

intravenous antibiotics and over the last 48 hours, his white count 

has normalized to 9.7.   Although he still has a shift, his absolute 

number of white cells went from 16.3 thousand down to 7.4 thousand 

today, which is in the normal range.  An MRI performed on 06/20/2018 

at 3:00 in the afternoon does indicate the presence of an abscess.  

Consultation is requested regarding evaluation and treatment of this 

patient.  The patient does note that the hand has significantly 

improved, as has his range of motion.

 

PAST MEDICAL HISTORY:

Negative for high blood pressure, diabetes, heart disease, kidney 

disease, liver degenerative, other disease of infectious etiology.

 

 

PAST SURGICAL HISTORY:

The patient indicates is significant for cardiac ablation for 

unspecified tachycardia.

 

 

MEDICATIONS:

He has no known active medications.

 

ALLERGIES:

NO KNOWN FOOD OR DRUG ALLERGIES.

 

FAMILY HISTORY:

Noncontributory.

 

SOCIAL HISTORY:

Smokes a pack of cigarettes per day.

 

PHYSICAL EXAMINATION:

GENERAL:  The patient is sitting comfortably in bed.

VITAL SIGNS:  Temperature is 98.8, pulse of 59, respirations 20, blood

pressure is 118/69, pulse oximetry is 99% on room air.

HEENT:  His extraocular muscles are intact.  His pupils are equal, 

round and reactive to light.  His mouth is clear.

NECK:  Supple without masses.

LUNGS:  Clear.

HEART:  Regular rate and rhythm.

EXTREMITIES:  Examination of the upper extremities reveals multiple 

superficial burns on the dorsal aspect of his right hand.  This is 

mostly over the prominences of the index, middle, and ring MP joints. 

These were all dorsal.  In addition, there was healing burns along the

dorsal aspect of his thumb.  In the area of the distal proximal 

phalanx and the joint, there does appear to be some swelling, although

there is no fluctuance, no tenderness.  The patient has excellent 

range of motion.  There is no pain in the joint with compression.  

There are no entrance wounds.  There is no drainage.

 

IMPRESSION:

The patient appears to have cellulitis.  I doubt that there is an 

abscess present.

 

PLAN:

The patient will be cleared by hand surgery for discharge on oral 

antibiotics.  If the patient does have a relapse, he can certainly 

return to the emergency room or call my office for followup.

 

 

__________________________________

MD EMMA Self/JUDITH

D: 06/21/2018, 03:26 PM

T: 06/21/2018, 04:10 PM

Visit #: [id number]

Job #: [id number]

## 2018-06-21 NOTE — PD.AMA
Against Medical Advice Note


Discharge Disposition:  Against Medical Advice


Pt Condition on Discharge:  Guarded


AMA Statement


Patient Damian Chaudhry has decided to leave the hospital against medical 

advice. This patient has the capacity to refuse care and understands the risks 

of leaving, including permanent disability and/or death, and has had an 

opportunity to ask questions about his condition. The patient has been informed 

that he may return for care at any time, and follow up has been arranged/

advised.











Lisseth Fournier Jun 21, 2018 20:30

## 2018-06-22 ENCOUNTER — HOSPITAL ENCOUNTER (OUTPATIENT)
Dept: HOSPITAL 17 - NEPE | Age: 28
Setting detail: OBSERVATION
LOS: 3 days | Discharge: LEFT BEFORE DISCHARGE | End: 2018-06-25
Attending: HOSPITALIST | Admitting: HOSPITALIST
Payer: SELF-PAY

## 2018-06-22 VITALS
SYSTOLIC BLOOD PRESSURE: 122 MMHG | OXYGEN SATURATION: 100 % | RESPIRATION RATE: 16 BRPM | TEMPERATURE: 98.5 F | HEART RATE: 89 BPM | DIASTOLIC BLOOD PRESSURE: 56 MMHG

## 2018-06-22 VITALS
HEART RATE: 80 BPM | DIASTOLIC BLOOD PRESSURE: 62 MMHG | SYSTOLIC BLOOD PRESSURE: 117 MMHG | RESPIRATION RATE: 16 BRPM | OXYGEN SATURATION: 98 %

## 2018-06-22 VITALS — HEIGHT: 72 IN | BODY MASS INDEX: 18.99 KG/M2 | WEIGHT: 140.21 LBS

## 2018-06-22 DIAGNOSIS — I47.1: ICD-10-CM

## 2018-06-22 DIAGNOSIS — F19.90: ICD-10-CM

## 2018-06-22 DIAGNOSIS — L03.011: Primary | ICD-10-CM

## 2018-06-22 DIAGNOSIS — E87.6: ICD-10-CM

## 2018-06-22 DIAGNOSIS — L02.519: ICD-10-CM

## 2018-06-22 DIAGNOSIS — F42.9: ICD-10-CM

## 2018-06-22 DIAGNOSIS — M79.641: ICD-10-CM

## 2018-06-22 DIAGNOSIS — Z91.19: ICD-10-CM

## 2018-06-22 DIAGNOSIS — F12.90: ICD-10-CM

## 2018-06-22 DIAGNOSIS — F17.210: ICD-10-CM

## 2018-06-22 DIAGNOSIS — F90.9: ICD-10-CM

## 2018-06-22 DIAGNOSIS — M79.89: ICD-10-CM

## 2018-06-22 LAB
BASOPHILS # BLD AUTO: 0.1 TH/MM3 (ref 0–0.2)
BASOPHILS NFR BLD: 0.5 % (ref 0–2)
BUN SERPL-MCNC: 8 MG/DL (ref 7–18)
CALCIUM SERPL-MCNC: 9.4 MG/DL (ref 8.5–10.1)
CHLORIDE SERPL-SCNC: 108 MEQ/L (ref 98–107)
CREAT SERPL-MCNC: 0.67 MG/DL (ref 0.6–1.3)
CRP SERPL-MCNC: 9.87 MG/DL (ref 0–0.3)
EOSINOPHIL # BLD: 0.1 TH/MM3 (ref 0–0.4)
EOSINOPHIL NFR BLD: 1.3 % (ref 0–4)
ERYTHROCYTE [DISTWIDTH] IN BLOOD BY AUTOMATED COUNT: 14.4 % (ref 11.6–17.2)
GFR SERPLBLD BASED ON 1.73 SQ M-ARVRAT: 142 ML/MIN (ref 89–?)
GLUCOSE SERPL-MCNC: 96 MG/DL (ref 74–106)
HCO3 BLD-SCNC: 27.4 MEQ/L (ref 21–32)
HCT VFR BLD CALC: 41.7 % (ref 39–51)
HGB BLD-MCNC: 14.3 GM/DL (ref 13–17)
LYMPHOCYTES # BLD AUTO: 1.6 TH/MM3 (ref 1–4.8)
LYMPHOCYTES NFR BLD AUTO: 14.9 % (ref 9–44)
MCH RBC QN AUTO: 31.1 PG (ref 27–34)
MCHC RBC AUTO-ENTMCNC: 34.4 % (ref 32–36)
MCV RBC AUTO: 90.4 FL (ref 80–100)
MONOCYTE #: 0.6 TH/MM3 (ref 0–0.9)
MONOCYTES NFR BLD: 5.9 % (ref 0–8)
NEUTROPHILS # BLD AUTO: 8.1 TH/MM3 (ref 1.8–7.7)
NEUTROPHILS NFR BLD AUTO: 77.4 % (ref 16–70)
PLATELET # BLD: 282 TH/MM3 (ref 150–450)
PMV BLD AUTO: 7.6 FL (ref 7–11)
RBC # BLD AUTO: 4.61 MIL/MM3 (ref 4.5–5.9)
SODIUM SERPL-SCNC: 144 MEQ/L (ref 136–145)
WBC # BLD AUTO: 10.5 TH/MM3 (ref 4–11)

## 2018-06-22 PROCEDURE — 96361 HYDRATE IV INFUSION ADD-ON: CPT

## 2018-06-22 PROCEDURE — 96365 THER/PROPH/DIAG IV INF INIT: CPT

## 2018-06-22 PROCEDURE — 99285 EMERGENCY DEPT VISIT HI MDM: CPT

## 2018-06-22 PROCEDURE — 73130 X-RAY EXAM OF HAND: CPT

## 2018-06-22 PROCEDURE — 80053 COMPREHEN METABOLIC PANEL: CPT

## 2018-06-22 PROCEDURE — 80048 BASIC METABOLIC PNL TOTAL CA: CPT

## 2018-06-22 PROCEDURE — 96375 TX/PRO/DX INJ NEW DRUG ADDON: CPT

## 2018-06-22 PROCEDURE — 80074 ACUTE HEPATITIS PANEL: CPT

## 2018-06-22 PROCEDURE — 76999 ECHO EXAMINATION PROCEDURE: CPT

## 2018-06-22 PROCEDURE — 85025 COMPLETE CBC W/AUTO DIFF WBC: CPT

## 2018-06-22 PROCEDURE — 96367 TX/PROPH/DG ADDL SEQ IV INF: CPT

## 2018-06-22 PROCEDURE — G0378 HOSPITAL OBSERVATION PER HR: HCPCS

## 2018-06-22 PROCEDURE — 86140 C-REACTIVE PROTEIN: CPT

## 2018-06-22 PROCEDURE — 96366 THER/PROPH/DIAG IV INF ADDON: CPT

## 2018-06-22 PROCEDURE — 80202 ASSAY OF VANCOMYCIN: CPT

## 2018-06-22 NOTE — RADRPT
EXAM DATE:  6/22/2018 10:34 PM EDT

AGE/SEX:        27 years / Male



INDICATIONS:  Pain. Previous infection in hand recently.



CLINICAL DATA:  This is the patient's initial encounter. Patient reports that signs and symptoms have
 been present for 1 day and indicates a pain score of 6/10. 

                                                                          

MEDICAL/SURGICAL HISTORY:       None. . Appendectomy. Cryoablation of heart



COMPARISON:      No prior exams available for comparison. 





FINDINGS:  

Bony structures are intact and in normal alignment. Osseous density is normal.  Soft tissues are unre
markable.  No radiopaque foreign bodies seen.   



CONCLUSION: 

Negative examination of the hand.  









Electronically signed by: Akhil Olivares MD  6/22/2018 10:46 PM EDT

## 2018-06-22 NOTE — HHI.DS
__________________________________________________





Discharge Summary


Admission Date


Jun 19, 2018 at 18:45


Discharge Date:  Jun 21, 2018


Admitting Diagnosis





sepsis, cellulitis





(1) IVDU (intravenous drug user)


ICD Code:  F19.90 - Other psychoactive substance use, unspecified, uncomplicated


Diagnosis:  Principal





(2) Cellulitis of right hand


ICD Code:  L03.113 - Cellulitis of right upper limb


Diagnosis:  Principal





Procedures


NONE


Brief History - From Admission


27-year-old male with no significant past medical history presents the 

emergency department for evaluation of swelling of his right hand.  The patient 

reports that approximately 3 days ago he burned himself on the oven at work.  

He states that since that time his hand has become more red and swollen and now 

has red streaks going up his arm towards his axilla.  He endorses subjective, 

severe fever/chills.  No chest pain or shortness of breath.  No cough.  No 

abdominal pain.  No nausea/vomiting/diarrhea.


CBC/BMP:  


6/21/18 0804                                                                   

             6/21/18 0804





Significant Findings





Laboratory Tests








Test


  6/19/18


17:00 6/20/18


07:10 6/20/18


14:54 6/20/18


16:45


 


White Blood Count


  18.3 TH/MM3


(4.0-11.0) 13.2 TH/MM3


(4.0-11.0) 


  


 


 


Neutrophils (%) (Auto)


  88.7 %


(16.0-70.0) 83.3 %


(16.0-70.0) 


  


 


 


Lymphocytes (%) (Auto)


  3.3 %


(9.0-44.0) 7.6 %


(9.0-44.0) 


  


 


 


Neutrophils # (Auto)


  16.3 TH/MM3


(1.8-7.7) 11.0 TH/MM3


(1.8-7.7) 


  


 


 


Lymphocytes # (Auto)


  0.6 TH/MM3


(1.0-4.8) 


  


  


 


 


Monocytes # (Auto)


  1.4 TH/MM3


(0-0.9) 1.1 TH/MM3


(0-0.9) 


  


 


 


Urine Ketones


  TRACE mg/dL


(NEG) 


  


  


 


 


Urine Urobilinogen


  2.0 mg/dL


(LESS THAN 2) 


  


  


 


 


Urine Bacteria


  RARE /hpf


(NONE) 


  


  


 


 


Urine Mucus FEW /lpf (OCC)    


 


Potassium Level


  3.4 MEQ/L


(3.5-5.1) 


  


  


 


 


Red Blood Count


  


  4.45 MIL/MM3


(4.50-5.90) 


  


 


 


Monocytes (%) (Auto)


  


  8.2 %


(0.0-8.0) 


  


 


 


Blood Urea Nitrogen  6 MG/DL (7-18)   


 


Total Protein


  


  5.9 GM/DL


(6.4-8.2) 


  


 


 


Albumin


  


  3.1 GM/DL


(3.4-5.0) 


  


 


 


Chloride Level


  


  111 MEQ/L


() 


  


 


 


Carbon Dioxide Level


  


  19.6 MEQ/L


(21.0-32.0) 


  


 


 


Urine Cannabinoids Screen   POS (NEG)  


 


Vancomycin Level Trough


  


  


  


  4.9 MCG/ML


(5.0-10.0)


 


Test


  6/21/18


08:04 


  


  


 


 


Red Blood Count


  4.02 MIL/MM3


(4.50-5.90) 


  


  


 


 


Hemoglobin


  12.5 GM/DL


(13.0-17.0) 


  


  


 


 


Hematocrit


  37.2 %


(39.0-51.0) 


  


  


 


 


Neutrophils (%) (Auto)


  75.9 %


(16.0-70.0) 


  


  


 


 


Monocytes (%) (Auto)


  8.8 %


(0.0-8.0) 


  


  


 


 


Blood Urea Nitrogen 4 MG/DL (7-18)    


 


Creatinine


  0.57 MG/DL


(0.60-1.30) 


  


  


 


 


Total Protein


  5.9 GM/DL


(6.4-8.2) 


  


  


 


 


Albumin


  2.8 GM/DL


(3.4-5.0) 


  


  


 


 


Calcium Level


  8.4 MG/DL


(8.5-10.1) 


  


  


 


 


Phosphorus Level


  2.3 MG/DL


(2.5-4.9) 


  


  


 


 


Aspartate Amino Transf


(AST/SGOT) 11 U/L (15-37) 


  


  


  


 


 


Chloride Level


  110 MEQ/L


() 


  


  


 








Imaging





Last Impressions








Hand MRI 6/20/18 0000 Signed





Impressions: 





 CONCLUSION: 





 Edema and enhancement throughout the dorsal soft tissues of the hand. There is 





 a crescentic multiloculated  fluid collection of the long the dorsal superficia





 l soft tissues of the thumb extending along the length of the first metacarpal 





 likely related to an abscess.





  





 





  





 








PE at Discharge


GENERAL: Awake alert and oriented 3 talkative and cooperative


SKIN: Warm and dry.  Tenderness to right hand with swelling RIGHT HAND LESS 

SWELLING- MOVING BETTER


HEAD: Atraumatic. Normocephalic. 


EYES: Pupils equal and round. No scleral icterus. No injection or drainage.  

Extraocular muscles intact


ENT: No nasal bleeding or discharge.  Mucous membranes pink and moist.  Tongue 

is midline


NECK: Trachea midline. No JVD.  Supple


CARDIOVASCULAR: Regular rate and rhythm.  S1-S2 no S3 or S4


RESPIRATORY: No accessory muscle use. Clear to auscultation. Breath sounds 

equal bilaterally. 


GASTROINTESTINAL: Abdomen soft, non-tender, nondistended. Hepatic and splenic 

margins not palpable. 


MUSCULOSKELETAL: Extremities without clubbing, cyanosis, or edema. No obvious 

deformities. 


NEUROLOGICAL: Awake and alert. No obvious cranial nerve deficits.  Motor 

grossly within normal limits. Five out of 5 muscle strength in the arms and 

legs.  Normal speech.  Right hand is LESS swollen, LESS tender, decreased range 

of motion HAS IMPROVED- CAN ALMOST CLOSE HAND NOW


PSYCHIATRIC: Appropriate mood and affect; insight and judgment normal.


Hospital Course


27-year-old male with no significant past medical history presents the 

emergency department for evaluation of swelling of his right hand.  The patient 

reports that approximately 3 days ago he burned himself on the oven at work.  

He states that since that time his hand has become more red and swollen and now 

has red streaks going up his arm towards his axilla.  He endorses subjective, 

severe fever/chills.  No chest pain or shortness of breath.  No cough.  No 

abdominal pain.  No nausea/vomiting/diarrhea.





6-20 COMPLAINS OF PAIN AND SWELLING IN RIGHT HAND


DW RN AND PT


ID CONSULT


MAY NEED HAND SURGERY CONSULT


PAIN CONTROL





6-21 SLOW IMPROVEMENT


USING KRATOM AT HOME


RIGHT HAND IS IMPROVING


DW RN AND PT


DW DR OSMAN





DECIDED TO GO AMA 


LEFT ON NO MEDICATIONS OR ANTIBIOTICS


Pt Condition on Discharge:  Guarded


Discharge Disposition:  Discharge Home (LEFT AMA)


Discharge Time:  <= 30 minutes


Discharge Instructions


DIET: Follow Instructions for:  As Tolerated, No Restrictions


Speech Therapy-Diet Recommends:  Regular


Medication Profile:  No Active Prescriptions or Reported Meds


Additional Information


PT LEFT Chino Riggins DO Jun 22, 2018 12:11

## 2018-06-22 NOTE — PD
HPI


Chief Complaint:  Skin Problem


Time Seen by Provider:  22:06


Travel History


International Travel<30 days:  No


Contact w/Intl Traveler<30days:  No


Traveled to known affect area:  No





History of Present Illness


HPI


27-year-old male the presents to the ED for evaluation of right hand swelling 

and pain.  Per patient he was released yesterday from the hospital.  Apparently 

patient had a birthday party for his daughter as well as apparently his grand 

mother was dying and need to be close to her.  Apparently he left AMA yesterday 

because of this reasons.  Patient was admitted to the hospital 3 days ago for 

evaluation of significant cellulitis with lymphangitis of the right hand and 

arm.  Per patient the infection itself appears to have improved and the 

swelling has dramatically improved except he continues to have some redness and 

pain on the knuckle of the right digit.  Denies any chest pain or shortness of 

breath.  No fevers chills or sweats.  No injury.  Patient left AMA so he was 

not given any medications.  Apparently he was seen by the hand surgeon but the 

hamster did not think that the abscess needed to be drained at the time.  No 

other medical issues.  Pain per patient is 6 out of 10.





PFSH


Past Medical History


ADHD:  Yes


Cancer:  No


Cardiovascular Problems:  Yes (PRE SVT)


Diminished Hearing:  No


Endocrine:  No


Genitourinary:  No


Immune Disorder:  No


Musculoskeletal:  No


Neurologic:  No


Psychiatric:  Yes (OCD)


Respiratory:  No


Immunizations Current:  Yes


Tetanus Vaccination:  > 5 Years


Influenza Vaccination:  No





Past Surgical History


Cardiac Surgery:  Yes (PSVT (CRYOABLATION))


Other Surgery:  Yes





Social History


Alcohol Use:  Yes


Tobacco Use:  Yes


Substance Use:  Yes (MARIJUANA, )





Allergies-Medications


(Allergen,Severity, Reaction):  


Coded Allergies:  


     No Known Allergies (Verified  Allergy, Unknown, 6/22/18)


Reported Meds & Prescriptions





Reported Meds & Active Scripts


Active


No Active Prescriptions or Reported Medications    








Review of Systems


Except as stated in HPI:  all other systems reviewed are Neg





Physical Exam


Narrative


GENERAL: 


SKIN: Warm and dry.


HEAD: Atraumatic. Normocephalic. 


EYES: Pupils equal and round. No scleral icterus. No injection or drainage. 


ENT: No nasal bleeding or discharge.  Mucous membranes pink and moist.  Tongue 

is midline.  No blood deviation.


NECK: Trachea midline. No JVD. 


CARDIOVASCULAR: Regular rate and rhythm.  


RESPIRATORY: No accessory muscle use. Clear to auscultation. Breath sounds 

equal bilaterally. 


GASTROINTESTINAL: Abdomen soft, non-tender, nondistended. Hepatic and splenic 

margins not palpable. 


MUSCULOSKELETAL: Extremities without clubbing, cyanosis, or edema. No obvious 

deformities.  Full range of motion of all extremities.  Patient does have what 

appears to be erythema that is blanchable on the dorsal aspect of the right 

hand.  Patient does have a little swelling and erythema noted on the dorsal 

aspect of the first digit around the PIP joint.  No obvious purulence or mass 

noted.  Does appear to be swollen however.  Pain with any range of motion of 

this digit.


NEUROLOGICAL: Awake and alert. No obvious cranial nerve deficits.  Motor 

grossly within normal limits. Five out of 5 muscle strength in the arms and 

legs.  Normal speech.


PSYCHIATRIC: Appropriate mood and affect; insight and judgment normal.





Data


Data


Last Documented VS





Vital Signs








  Date Time  Temp Pulse Resp B/P (MAP) Pulse Ox O2 Delivery O2 Flow Rate FiO2


 


6/22/18 19:55 98.5 89 16 122/56 (78) 100   








Orders





 Orders


Hand, Complete (Etc2nxf) (6/22/18 22:11)


Complete Blood Count With Diff (6/22/18 22:11)


Basic Metabolic Panel (Bmp) (6/22/18 22:11)


C-Reactive Protein (Crp) (6/22/18 22:11)


Iv Access Insert/Monitor (6/22/18 22:11)


Vancomycin Inj (Vancomycin Inj) (6/22/18 22:15)


Piperacil-Tazo 3.375 Gm Premix (Zosyn 3. (6/22/18 22:15)








MDM


Medical Decision Making


Medical Screen Exam Complete:  Yes


Emergency Medical Condition:  Yes


Medical Record Reviewed:  Yes


Differential Diagnosis


Cellulitis versus abscess versus noncompliant


Narrative Course


27-year-old male the presents to the ED for evaluation of infection to his 

right hand.  Patient was properly examined and was found to have signs and 

symptoms consistent appears to be cellulitis.  Patient left AMA from here 

yesterday.  Labs and imaging ordered.  Case will be sent to my attending 

pending disposition and plan.


Scripts


No Active Prescriptions or Reported Meds











Harley Gale Jun 22, 2018 22:22

## 2018-06-23 VITALS
TEMPERATURE: 98.7 F | SYSTOLIC BLOOD PRESSURE: 118 MMHG | HEART RATE: 59 BPM | RESPIRATION RATE: 18 BRPM | DIASTOLIC BLOOD PRESSURE: 69 MMHG | OXYGEN SATURATION: 98 %

## 2018-06-23 VITALS
HEART RATE: 61 BPM | OXYGEN SATURATION: 98 % | SYSTOLIC BLOOD PRESSURE: 118 MMHG | RESPIRATION RATE: 16 BRPM | DIASTOLIC BLOOD PRESSURE: 60 MMHG

## 2018-06-23 VITALS
RESPIRATION RATE: 16 BRPM | SYSTOLIC BLOOD PRESSURE: 116 MMHG | TEMPERATURE: 97.8 F | HEART RATE: 67 BPM | OXYGEN SATURATION: 99 % | DIASTOLIC BLOOD PRESSURE: 70 MMHG

## 2018-06-23 VITALS
TEMPERATURE: 97 F | OXYGEN SATURATION: 98 % | DIASTOLIC BLOOD PRESSURE: 65 MMHG | SYSTOLIC BLOOD PRESSURE: 118 MMHG | RESPIRATION RATE: 20 BRPM | HEART RATE: 55 BPM

## 2018-06-23 VITALS
SYSTOLIC BLOOD PRESSURE: 109 MMHG | HEART RATE: 76 BPM | OXYGEN SATURATION: 99 % | RESPIRATION RATE: 16 BRPM | DIASTOLIC BLOOD PRESSURE: 58 MMHG

## 2018-06-23 VITALS
OXYGEN SATURATION: 99 % | DIASTOLIC BLOOD PRESSURE: 67 MMHG | HEART RATE: 84 BPM | RESPIRATION RATE: 16 BRPM | SYSTOLIC BLOOD PRESSURE: 114 MMHG

## 2018-06-23 RX ADMIN — SODIUM CHLORIDE SCH MLS/HR: 900 INJECTION INTRAVENOUS at 17:35

## 2018-06-23 RX ADMIN — PHENYTOIN SODIUM SCH MLS/HR: 50 INJECTION INTRAMUSCULAR; INTRAVENOUS at 20:44

## 2018-06-23 RX ADMIN — STANDARDIZED SENNA CONCENTRATE AND DOCUSATE SODIUM SCH TAB: 8.6; 5 TABLET, FILM COATED ORAL at 08:31

## 2018-06-23 RX ADMIN — TAZOBACTAM SODIUM AND PIPERACILLIN SODIUM SCH MLS/HR: 500; 4 INJECTION, SOLUTION INTRAVENOUS at 03:31

## 2018-06-23 RX ADMIN — PHENYTOIN SODIUM SCH MLS/HR: 50 INJECTION INTRAMUSCULAR; INTRAVENOUS at 10:44

## 2018-06-23 RX ADMIN — PHENYTOIN SODIUM SCH MLS/HR: 50 INJECTION INTRAMUSCULAR; INTRAVENOUS at 06:12

## 2018-06-23 RX ADMIN — STANDARDIZED SENNA CONCENTRATE AND DOCUSATE SODIUM SCH TAB: 8.6; 5 TABLET, FILM COATED ORAL at 21:00

## 2018-06-23 RX ADMIN — SODIUM CHLORIDE SCH MLS/HR: 900 INJECTION INTRAVENOUS at 09:18

## 2018-06-23 RX ADMIN — Medication SCH ML: at 08:32

## 2018-06-23 RX ADMIN — TAZOBACTAM SODIUM AND PIPERACILLIN SODIUM SCH MLS/HR: 500; 4 INJECTION, SOLUTION INTRAVENOUS at 08:31

## 2018-06-23 NOTE — MB
cc:

Shira Berger MD

****

 

 

DATE:

06/23/2018

 

REQUESTING PHYSICIAN:

Patient is being seen at the request of Dr. Saida Arciniega.

 

REASON FOR CONSULTATION:

Hand abscess.

 

HISTORY OF PRESENT ILLNESS:

The patient is a 27-year-old male who was admitted recently on 

06/19/2018 for a similar complaint.  At that time, he had a diagnosis 

of a right hand cellulitis secondary to a burn.  An MRI was suspicious

for an abscess over the thumb.  Examination at the time failed to 

reveal any evidence of an abscess.  The patient left A on 06/22/2018

and has not been taking any antibiotics since that time.  He now 

returns complaining that the swelling is worse to his right thumb.  

Consultation is requested regarding evaluation and treatment of his 

right thumb.

 

PAST MEDICAL HISTORY:

Negative except as noted above.  The medical history has not changed 

over the last 2-3 days since he was admitted.

 

PHYSICAL EXAMINATION:

GENERAL:  The patient is lying comfortably on the stretcher.

VITAL SIGNS:  Temperature is 98.5, pulse is 61, respirations are 16, 

blood pressure is 118/60 and his pulse oximetry is 98 on room air.

HEENT:  His extraocular muscles are intact.  His pupils are equal, 

round and reactive to light.  His mouth is clear.

NECK:  Supple without masses.

LUNGS:  Clear.

HEART:  Regular rate and rhythm.

EXTREMITIES:  Examination of his upper extremities reveals some 

swelling over the dorsal aspect of his right thumb.  Again today, I do

not appreciate the presence of an abscess.  There is swelling.  There 

is some slight tenderness, but there is no focal tenderness.  The 

patient's range of motion is slightly diminished from the last time he

was here.

 

LABORATORY DATA:

Reveals a white count of 10.5 with 77.4% neutrophils, which is 

slightly elevated.  His white count prior to admission on 06/21/2018 

was 9.7 and the absolute number of neutrophils are 7.4, 75.9%, so he 

does have a slight increase.

 

IMPRESSION:

The patient to me has continued cellulitis with swelling over his 

right thumb.

 

PLAN:

I have asked the ER physician to order an ultrasound in order to 

evaluate the possibility of the abscess.

 

 

__________________________________

Shira Berger MD

 

 

LHB/JUDITH

D: 06/23/2018, 11:07 AM

T: 06/23/2018, 11:31 AM

Visit #: [id number]

Job #: [id number]

## 2018-06-23 NOTE — PD
Physical Exam


Narrative


GENERAL: 27-year-old male in no apparent distress


SKIN: To back of right hand he has redness and swelling.  Most is localized 

around the base of his right thumb which appears to be abscess-like in 

appearance.


HEAD: Atraumatic. Normocephalic. 


EYES: Pupils equal and round. 


ENT: No nasal bleeding or discharge.  Mucous membranes pink and moist.


NECK: Trachea midline. 


CARDIOVASCULAR: Regular rate and rhythm.  


RESPIRATORY: No accessory muscle use. no increased effort


MUSCULOSKELETAL: No obvious deformities. No clubbing.  No cyanosis, Pain with 

palpation of right hand, no pain with other joints , neurovascularly intact, 

compartments soft.


NEUROLOGICAL: Awake and alert. Motor grossly within normal limits. Normal 

speech.





Data


Data


Last Documented VS





Vital Signs








  Date Time  Temp Pulse Resp B/P (MAP) Pulse Ox O2 Delivery O2 Flow Rate FiO2


 


6/23/18 00:00  76 16 109/58 (75) 99 Room Air  


 


6/22/18 19:55 98.5       








Orders





 Orders


Hand, Complete (Qta5olo) (6/22/18 22:11)


Complete Blood Count With Diff (6/22/18 22:11)


Basic Metabolic Panel (Bmp) (6/22/18 22:11)


C-Reactive Protein (Crp) (6/22/18 22:11)


Iv Access Insert/Monitor (6/22/18 22:11)


Vancomycin Inj (Vancomycin Inj) (6/22/18 22:15)


Piperacil-Tazo 3.375 Gm Premix (Zosyn 3. (6/22/18 22:15)


Admit Order (Ed Use Only) (6/23/18 00:30)





Labs





Laboratory Tests








Test


  6/22/18


22:45


 


White Blood Count 10.5 TH/MM3 


 


Red Blood Count 4.61 MIL/MM3 


 


Hemoglobin 14.3 GM/DL 


 


Hematocrit 41.7 % 


 


Mean Corpuscular Volume 90.4 FL 


 


Mean Corpuscular Hemoglobin 31.1 PG 


 


Mean Corpuscular Hemoglobin


Concent 34.4 % 


 


 


Red Cell Distribution Width 14.4 % 


 


Platelet Count 282 TH/MM3 


 


Mean Platelet Volume 7.6 FL 


 


Neutrophils (%) (Auto) 77.4 % 


 


Lymphocytes (%) (Auto) 14.9 % 


 


Monocytes (%) (Auto) 5.9 % 


 


Eosinophils (%) (Auto) 1.3 % 


 


Basophils (%) (Auto) 0.5 % 


 


Neutrophils # (Auto) 8.1 TH/MM3 


 


Lymphocytes # (Auto) 1.6 TH/MM3 


 


Monocytes # (Auto) 0.6 TH/MM3 


 


Eosinophils # (Auto) 0.1 TH/MM3 


 


Basophils # (Auto) 0.1 TH/MM3 


 


CBC Comment DIFF FINAL 


 


Differential Comment  


 


Blood Urea Nitrogen 8 MG/DL 


 


Creatinine 0.67 MG/DL 


 


Random Glucose 96 MG/DL 


 


Calcium Level 9.4 MG/DL 


 


Sodium Level 144 MEQ/L 


 


Potassium Level 3.9 MEQ/L 


 


Chloride Level 108 MEQ/L 


 


Carbon Dioxide Level 27.4 MEQ/L 


 


Anion Gap 9 MEQ/L 


 


Estimat Glomerular Filtration


Rate 142 ML/MIN 


 


 


C-Reactive Protein 9.87 MG/DL 











MDM


Supervised Visit with HEENA:  Yes


Interpretation(s)


CBC & BMP Diagram


6/22/18 22:45








Calcium Level 9.4 #








Last 24 hours Impressions








Hand X-Ray 6/22/18 2211 Signed





Impressions: 





 CONCLUSION: 





 Negative examination of the hand.  





  





 





  





 





  





 





  





 








Narrative Course


I, Dr. lozada, have reviewed the advance practice practitioner's documentation 

and am in agreement, met with the patient face to face, made the diagnosis, and 

the medical decision making was done by me.  





*My assessment and Findings: 26 y/o male who recently left the hospital AMA 

without antibiotics.  He states the swelling and redness have gotten worse to 

his right thumb area and he is worried that it is going to get worse.  His 

blood work show no emergent process.  X-ray shows no process.  Imaging showed 

abscess but when evaluated by hand surgeon they did not think he had an 

abscess.  Given area has gotten worse in his right thumb area and he has not 

been on antibiotics and with note noting history of IV drug abuse will place in 

observation for antibiotics and he will likely need reevaluation by the hand 

surgeon to make sure he did not develop an abscess that needs drainage.


Physician Communication


Physician Communication


dr reid to admit





Diagnosis





 Primary Impression:  


 Cellulitis of right hand





Admitting Information


Admitting Physician Requests:  Observation


Scripts


No Active Prescriptions or Reported Meds











Lilian Lozada MD Jun 23, 2018 00:40

## 2018-06-23 NOTE — PD.ID.CON
History of Present Illness


Service


Infectious disease


Consult Requested By





Reason for Consult


Evaluation and management of right hand infection


Primary Care Physician


No Primary Care Physician


Diagnoses:  


History of Present Illness


 is a 26 y/o CM with no significant PMHx presents to the emergency 

department for evaluation of swelling of his right hand.  The patient reports 

that approximately 3 days ago he burned himself on the oven at work.  He states 

that since that time his hand has become more red and swollen and now has red 

streaks going up his arm towards his axilla.  Patient was admitted to the 

hospital received Zosyn IV as well as vancomycin IV.  Patient underwent an MRI 

which showed fluid collection.  Patient was subsequently evaluated by hand 

surgery and they believe that this fluid collection was just from inflammation 

did not think this was an abscess that needed drainage is clinically patient 

had improved and was able to flex his fingers of his hand.  Of note when he was 

discharged he mentioned that it was his daughter's birthday and now the nurse 

tells me that he had to leave the hospital the last time because his 

grandmother .





He endorses subjective, severe fever/chills.  


No chest pain or shortness of breath.  


No cough. No abdominal pain.  


No nausea/vomiting/diarrhea.





denies any prior endocarditis, discitis or epidural abscesses.





ID consulted for evaluation and Mment of right hand abscess and cellulitis 

possible tendinitis.





Review of Systems


ROS Limitations:  Poor Historian





Past Family Social History


Allergies:  


Coded Allergies:  


     No Known Allergies (Verified  Allergy, Unknown, 18)


Past Medical History


Cardiac ablation for unspecified tachycardia


IVDA





Past Surgical History


none per patient.


Reported Medications





Reported Meds & Active Scripts


Active


Active Ordered Medications





Current Medications








 Medications


  (Trade)  Dose


 Ordered  Sig/Rigo


 Route  Start Time


 Stop Time Status Last Admin


 


 Pharmacy Profile


 Note  0 ml @ 0


 mls/hr  [facility]


 OTHER  18 00:45


     


 


 


 Piperacillin Sod/


 Tazobactam Sod  100 ml @ 


 200 mls/hr  Q6H


 IV  18 04:00


    18 08:31


 


 


 Sodium Chloride  1,000 ml @ 


 100 mls/hr  Q10H


 IV  18 00:44


    18 06:12


 


 


  (NS Flush)  2 ml  [facility]  PRN


 IV FLUSH  18 00:45


     


 


 


  (NS Flush)  2 ml  BID


 IV FLUSH  18 09:00


    18 08:32


 


 


  (Reglan Inj)  5 mg  Q6H  PRN


 IV PUSH  18 00:45


     


 


 


  (Tylenol)  650 mg  Q6H  PRN


 PO  18 00:45


     


 


 


  (Norco  5-325 Mg)  1 tab  Q4H  PRN


 PO  18 00:45


     


 


 


  (Morphine Inj)  2 mg  Q3H  PRN


 IV PUSH  18 00:45


     


 


 


  (Ashely-Colace)  1 tab  BID


 PO  18 09:00


     


 


 


  (Milk Of


 Magnesia Liq)  30 ml  Q12H  PRN


 PO  18 00:45


     


 


 


  (Senokot)  17.2 mg  Q12H  PRN


 PO  18 00:45


     


 


 


  (Dulcolax Supp)  10 mg  DAILY  PRN


 RECTAL  18 00:45


     


 


 


  (Lactulose Liq)  30 ml  DAILY  PRN


 PO  18 00:45


     


 


 


 Vancomycin HCl


 1250 mg/Sodium


 Chloride  262.5 ml @ 


 250 mls/hr  Q8H


 IV  18 10:00


    18 09:18


 


 


  ([facility]


 Ordered Lab Info)  SPECIFIC LAB TO BE


 DRAWN:VANCOMYCIN


 TROUGH DATE TO...  ONCE  ONCE


 .XX  18 09:45


 18 09:46   


 


 


  (Norco  Mg)  1 tab  Q6H  PRN


 PO  18 15:45


     


 








Family History


reviewed.


Social History


IVDA.





Physical Exam


Vital Signs





Vital Signs








  Date Time  Temp Pulse Resp B/P (MAP) Pulse Ox O2 Delivery O2 Flow Rate FiO2


 


18 04:30  61 16 118/60 (79) 98 Room Air  


 


18 01:00  84 16 114/67 (83) 99 Room Air  


 


18 00:00  76 16 109/58 (75) 99 Room Air  


 


18 22:50  80 16 117/62 (80) 98 Room Air  


 


18 19:55 98.5 89 16 122/56 (78) 100   








Physical Exam


GENERAL: This is a well-nourished, well-developed patient, in no apparent 

distress.


SKIN: No rashes, ecchymoses or lesions. Cool and dry.


HEAD: Atraumatic. Normocephalic. No temporal or scalp tenderness.


EYES: Pupils equal round and reactive. Extraocular motions intact. No scleral 

icterus. No injection or drainage. 


ENT: Nose without bleeding, purulent drainage or septal hematoma. Throat 

without erythema, tonsillar hypertrophy or exudate. Uvula midline. Airway 

patent.


NECK: Trachea midline.  Supple, nontender, no meningeal signs.


CARDIOVASCULAR: HS audible. 


RESPIRATORY: Clear to auscultation. Breath sounds equal bilaterally. No wheezes

, rales, or rhonchi.  


GASTROINTESTINAL: Abdomen soft, non-tender, nondistended. 


MUSCULOSKELETAL: Right hand fingers improved, thumb with swelling, induration, 

and erythema. Able to flex fingers.


NEUROLOGICAL: Awake and alert. Non focal


Psych cooperative.


IV line sites with no e.o infection.


Laboratory





Laboratory Tests








Test


  18


22:45


 


White Blood Count 10.5 


 


Red Blood Count 4.61 


 


Hemoglobin 14.3 


 


Hematocrit 41.7 


 


Mean Corpuscular Volume 90.4 


 


Mean Corpuscular Hemoglobin 31.1 


 


Mean Corpuscular Hemoglobin


Concent 34.4 


 


 


Red Cell Distribution Width 14.4 


 


Platelet Count 282 


 


Mean Platelet Volume 7.6 


 


Neutrophils (%) (Auto) 77.4 


 


Lymphocytes (%) (Auto) 14.9 


 


Monocytes (%) (Auto) 5.9 


 


Eosinophils (%) (Auto) 1.3 


 


Basophils (%) (Auto) 0.5 


 


Neutrophils # (Auto) 8.1 


 


Lymphocytes # (Auto) 1.6 


 


Monocytes # (Auto) 0.6 


 


Eosinophils # (Auto) 0.1 


 


Basophils # (Auto) 0.1 


 


CBC Comment DIFF FINAL 


 


Differential Comment  


 


Blood Urea Nitrogen 8 


 


Creatinine 0.67 


 


Random Glucose 96 


 


Calcium Level 9.4 


 


Sodium Level 144 


 


Potassium Level 3.9 


 


Chloride Level 108 


 


Carbon Dioxide Level 27.4 


 


Anion Gap 9 


 


Estimat Glomerular Filtration


Rate 142 


 


 


C-Reactive Protein 9.87 








Result Diagram:  


18





Imaging





Last Impressions








Soft Tissue Ultrasound 18 0000 Signed





Impressions: 





 CONCLUSION: 





 1.  Extensive soft tissue swelling right thumb without abscess.





  





 





  





 





  





 





  





 


 


Hand X-Ray 18 Signed





Impressions: 





 CONCLUSION: 





 Negative examination of the hand.  





  





 





  





 





  





 





  





 











Assessment and Plan


Assessment and Plan


Right hand infection, abscess. 


IVDA





Recs:


Continue Vanco IV target 10-15.


DC Zosyn IV


Follow cultures


Follow clinically.











Mary Reynoso MD 2018 14:36

## 2018-06-23 NOTE — RADRPT
EXAM DATE:  6/23/2018 10:44 AM EDT

AGE/SEX:        27 years / Male



INDICATIONS:  Right thumb pain and swelling.



CLINICAL DATA:  This is the patient's initial encounter. Patient reports that signs and symptoms have
 been present for 4 - 6 days and indicates a pain score of 5/10. 

                                    Location: Laterality:

                                                                          

MEDICAL/SURGICAL HISTORY:       . Paroxysmal supraventricular tachycardia.IV drug use.  . Cardiac abl
ation.



COMPARISON:      No prior exams available for comparison. 

No external comparison.



FINDINGS: 

Targeted sonogram right thumb demonstrates extensive soft tissue swelling with increased vascularity.
 No fluid collection or abscess.



CONCLUSION: 

1.  Extensive soft tissue swelling right thumb without abscess.









Electronically signed by: Baljinder Raya MD  6/23/2018 10:46 AM EDT

## 2018-06-23 NOTE — PD.PLAS.PN
Subjective


Remarks


Ultrasound update








Vital Signs








  Date Time  Temp Pulse Resp B/P (MAP) Pulse Ox O2 Delivery O2 Flow Rate FiO2


 


6/23/18 04:30  61 16 118/60 (79) 98 Room Air  


 


6/23/18 01:00  84 16 114/67 (83) 99 Room Air  


 


6/23/18 00:00  76 16 109/58 (75) 99 Room Air  


 


6/22/18 22:50  80 16 117/62 (80) 98 Room Air  


 


6/22/18 19:55 98.5 89 16 122/56 (78) 100   














I/O      


 


 6/22/18 6/22/18 6/22/18 6/23/18 6/23/18 6/23/18





 07:00 15:00 23:00 07:00 15:00 23:00


 


Intake Total    500 ml  


 


Balance    500 ml  


 


      


 


Intake IV Total    500 ml  








Laboratory Tests








Test


  6/22/18


22:45


 


White Blood Count 10.5 


 


Red Blood Count 4.61 


 


Hemoglobin 14.3 


 


Hematocrit 41.7 


 


Mean Corpuscular Volume 90.4 


 


Mean Corpuscular Hemoglobin 31.1 


 


Mean Corpuscular Hemoglobin


Concent 34.4 


 


 


Red Cell Distribution Width 14.4 


 


Platelet Count 282 


 


Mean Platelet Volume 7.6 


 


Neutrophils (%) (Auto) 77.4 


 


Lymphocytes (%) (Auto) 14.9 


 


Monocytes (%) (Auto) 5.9 


 


Eosinophils (%) (Auto) 1.3 


 


Basophils (%) (Auto) 0.5 


 


Neutrophils # (Auto) 8.1 


 


Lymphocytes # (Auto) 1.6 


 


Monocytes # (Auto) 0.6 


 


Eosinophils # (Auto) 0.1 


 


Basophils # (Auto) 0.1 


 


CBC Comment DIFF FINAL 


 


Differential Comment  


 


Blood Urea Nitrogen 8 


 


Creatinine 0.67 


 


Random Glucose 96 


 


Calcium Level 9.4 


 


Sodium Level 144 


 


Potassium Level 3.9 


 


Chloride Level 108 


 


Carbon Dioxide Level 27.4 


 


Anion Gap 9 


 


Estimat Glomerular Filtration


Rate 142 


 


 


C-Reactive Protein 9.87 








Result Diagram:  


6/22/18 2245 6/22/18 2245





Exam Findings


Ultrasound negative for abscess.





Plan


Impression: Cellulitis relapse secondary to non-compliance.


Plan: IV antibiotics.











Shira Berger MD Jun 23, 2018 11:12

## 2018-06-24 VITALS
TEMPERATURE: 97.7 F | RESPIRATION RATE: 17 BRPM | OXYGEN SATURATION: 97 % | HEART RATE: 59 BPM | SYSTOLIC BLOOD PRESSURE: 115 MMHG | DIASTOLIC BLOOD PRESSURE: 58 MMHG

## 2018-06-24 VITALS
SYSTOLIC BLOOD PRESSURE: 133 MMHG | OXYGEN SATURATION: 98 % | RESPIRATION RATE: 16 BRPM | HEART RATE: 67 BPM | TEMPERATURE: 97.6 F | DIASTOLIC BLOOD PRESSURE: 68 MMHG

## 2018-06-24 VITALS
OXYGEN SATURATION: 99 % | SYSTOLIC BLOOD PRESSURE: 102 MMHG | HEART RATE: 50 BPM | RESPIRATION RATE: 18 BRPM | DIASTOLIC BLOOD PRESSURE: 58 MMHG | TEMPERATURE: 97.6 F

## 2018-06-24 LAB
ALBUMIN SERPL-MCNC: 3 GM/DL (ref 3.4–5)
ALP SERPL-CCNC: 96 U/L (ref 45–117)
ALT SERPL-CCNC: 25 U/L (ref 12–78)
AST SERPL-CCNC: 9 U/L (ref 15–37)
BASOPHILS # BLD AUTO: 0 TH/MM3 (ref 0–0.2)
BASOPHILS NFR BLD: 0.5 % (ref 0–2)
BILIRUB SERPL-MCNC: 0.2 MG/DL (ref 0.2–1)
BUN SERPL-MCNC: 10 MG/DL (ref 7–18)
CALCIUM SERPL-MCNC: 8.6 MG/DL (ref 8.5–10.1)
CHLORIDE SERPL-SCNC: 111 MEQ/L (ref 98–107)
CREAT SERPL-MCNC: 0.55 MG/DL (ref 0.6–1.3)
EOSINOPHIL # BLD: 0.2 TH/MM3 (ref 0–0.4)
EOSINOPHIL NFR BLD: 3 % (ref 0–4)
ERYTHROCYTE [DISTWIDTH] IN BLOOD BY AUTOMATED COUNT: 13.8 % (ref 11.6–17.2)
GFR SERPLBLD BASED ON 1.73 SQ M-ARVRAT: 179 ML/MIN (ref 89–?)
GLUCOSE SERPL-MCNC: 74 MG/DL (ref 74–106)
HCO3 BLD-SCNC: 23.9 MEQ/L (ref 21–32)
HCT VFR BLD CALC: 40.2 % (ref 39–51)
HGB BLD-MCNC: 13.4 GM/DL (ref 13–17)
LYMPHOCYTES # BLD AUTO: 2.2 TH/MM3 (ref 1–4.8)
LYMPHOCYTES NFR BLD AUTO: 28.9 % (ref 9–44)
MCH RBC QN AUTO: 31 PG (ref 27–34)
MCHC RBC AUTO-ENTMCNC: 33.4 % (ref 32–36)
MCV RBC AUTO: 92.7 FL (ref 80–100)
MONOCYTE #: 0.7 TH/MM3 (ref 0–0.9)
MONOCYTES NFR BLD: 9.2 % (ref 0–8)
NEUTROPHILS # BLD AUTO: 4.4 TH/MM3 (ref 1.8–7.7)
NEUTROPHILS NFR BLD AUTO: 58.4 % (ref 16–70)
PLATELET # BLD: 309 TH/MM3 (ref 150–450)
PMV BLD AUTO: 7.7 FL (ref 7–11)
PROT SERPL-MCNC: 6.4 GM/DL (ref 6.4–8.2)
RBC # BLD AUTO: 4.34 MIL/MM3 (ref 4.5–5.9)
SODIUM SERPL-SCNC: 143 MEQ/L (ref 136–145)
WBC # BLD AUTO: 7.5 TH/MM3 (ref 4–11)

## 2018-06-24 RX ADMIN — VANCOMYCIN HYDROCHLORIDE SCH MLS/HR: 1 INJECTION, SOLUTION INTRAVENOUS at 23:41

## 2018-06-24 RX ADMIN — Medication SCH ML: at 00:00

## 2018-06-24 RX ADMIN — HYDROCODONE BITARTRATE AND ACETAMINOPHEN PRN TAB: 10; 325 TABLET ORAL at 23:13

## 2018-06-24 RX ADMIN — SODIUM CHLORIDE SCH MLS/HR: 900 INJECTION INTRAVENOUS at 04:17

## 2018-06-24 RX ADMIN — HYDROCODONE BITARTRATE AND ACETAMINOPHEN PRN TAB: 10; 325 TABLET ORAL at 17:22

## 2018-06-24 RX ADMIN — HYDROCODONE BITARTRATE AND ACETAMINOPHEN PRN TAB: 10; 325 TABLET ORAL at 00:10

## 2018-06-24 RX ADMIN — STANDARDIZED SENNA CONCENTRATE AND DOCUSATE SODIUM SCH TAB: 8.6; 5 TABLET, FILM COATED ORAL at 21:00

## 2018-06-24 RX ADMIN — PHENYTOIN SODIUM SCH MLS/HR: 50 INJECTION INTRAMUSCULAR; INTRAVENOUS at 15:45

## 2018-06-24 RX ADMIN — Medication SCH ML: at 23:13

## 2018-06-24 RX ADMIN — STANDARDIZED SENNA CONCENTRATE AND DOCUSATE SODIUM SCH TAB: 8.6; 5 TABLET, FILM COATED ORAL at 09:00

## 2018-06-24 RX ADMIN — PHENYTOIN SODIUM SCH MLS/HR: 50 INJECTION INTRAMUSCULAR; INTRAVENOUS at 06:44

## 2018-06-24 RX ADMIN — Medication SCH ML: at 09:32

## 2018-06-24 RX ADMIN — HYDROCODONE BITARTRATE AND ACETAMINOPHEN PRN TAB: 10; 325 TABLET ORAL at 11:03

## 2018-06-24 RX ADMIN — VANCOMYCIN HYDROCHLORIDE SCH MLS/HR: 1 INJECTION, SOLUTION INTRAVENOUS at 14:26

## 2018-06-24 NOTE — HHI.PR
Subjective


Remarks


Follow-up on patient with right hand cellulitis.  Patient seen and examined.  

Patient states right hand has improved some.  He has noticed mild increase in 

range of motion in the right thumb with less swelling.  Continues to have 

redness over the majority of the base of the thumb.  He reports sweats last 

night.  He denies any nausea, vomiting or abdominal pain.  He denies any chest 

pain or shortness of breath.





Objective


Vitals





Vital Signs








  Date Time  Temp Pulse Resp B/P (MAP) Pulse Ox O2 Delivery O2 Flow Rate FiO2


 


6/24/18 12:03   17     


 


6/24/18 04:38 97.6 50 18 102/58 (73) 99   


 


6/23/18 23:50 98.7 59 18 118/69 (85) 98   


 


6/23/18 20:50 97.8 67 16 116/70 (85) 99   


 


6/23/18 16:00 97.0 55 20 118/65 (82) 98   














I/O      


 


 6/23/18 6/23/18 6/23/18 6/24/18 6/24/18 6/24/18





 07:00 15:00 23:00 07:00 15:00 23:00


 


Intake Total 500 ml   500 ml 262.5 ml 


 


Output Total   700 ml   


 


Balance 500 ml  -700 ml 500 ml 262.5 ml 


 


      


 


Intake Oral    500 ml  


 


IV Total 500 ml    262.5 ml 


 


Output Urine Total   700 ml   


 


# Voids    4  


 


# Bowel Movements   1   








Result Diagram:  


6/24/18 0620                                                                   

             6/24/18 0620





Imaging





Last Impressions








Soft Tissue Ultrasound 6/23/18 0000 Signed





Impressions: 





 CONCLUSION: 





 1.  Extensive soft tissue swelling right thumb without abscess.





  





 





  





 





  





 





  





 


 


Hand X-Ray 6/22/18 2211 Signed





Impressions: 





 CONCLUSION: 





 Negative examination of the hand.  





  





 





  





 





  





 





  





 








Objective Remarks


GENERAL: Well-developed well-nourished young  male patient in no acute 

distress.  Awake and alert.  Appears comfortable.


SKIN:  Warm and dry.  No generalized rash.


HEENT: PERRLA, EOMI. No scleral icterus or conjunctival pallor. No lid lag or 

facial droop.  


CARDIOVASCULAR: Regular rate and rhythm.  No obvious murmurs to auscultation. 

No chest tenderness to palpation. 


RESPIRATORY: Nonlabored.  No obvious rhonchi or wheezing. Clear to 

auscultation. Breath sounds equal bilaterally. 


GASTROINTESTINAL: Abdomen soft, non-tender, nondistended. BS normal. 


MUSCULOSKELETAL: Extremities without clubbing or cyanosis. No obvious 

deformities. Right hand erythema/edema mostly over the dorsal aspect of the 

right thumb, slightly decreased erythema over the back of the hand.  Decreased 

range of motion mostly in the right thumb.. Pulses intact.


NEUROLOGICAL: Awake, alert and oriented x4. CN II-XII grossly intact. Moving 

both upper and lower extremities spontaneously. Nonfocal.  Normal speech.


PSYCHIATRIC: Calm and cooperative.


Procedures


None





A/P


Problem List:  


(1) Hand abscess


ICD Code:  L02.519 - Cutaneous abscess of unspecified hand


(2) IVDU (intravenous drug user)


ICD Code:  F19.90 - Other psychoactive substance use, unspecified, uncomplicated


Assessment and Plan


27-year-old male with a PMH of ADHD, Paroxysmal SVT, OCD and IVDU who presented 

to ER with complaints of right hand swelling and pain.   Patient previously 

admitted 619 for similar complaints with MRI showing possible abscess, followed 

by ID and hand surgery on vancomycin and Zosyn left AMA on 6/22.





Right hand/thumb cellulitis


Recent admit 6/19/18 for similar, MRI Hand 6/20/18 w/ likely abscess of thumb 

extending into first metacarpal, images reviewed.  


Right hand US shows extensive soft tissue swelling without abscess


   -Hand surgery following, appreciate assistance.  Nonoperative management at 

this time.


   -Infectious disease following, appreciate assistance.  Continue vancomycin 

per ID recommendations.


   -Monitor for clinical improvement


   


IVDU:  Pt counselled.  


   -Ativan prn as needed. 





Hypokalemia


   -Oral repletion ordered


   -Repeat BMP in a.m. to monitor response





DVT Prophylaxis:  SCD/Teds


Discharge Planning


Not ready for discharge.  Discharge pending ID and hand clearance.











Philly Viramontes Jun 24, 2018 13:40

## 2018-06-25 VITALS
DIASTOLIC BLOOD PRESSURE: 66 MMHG | OXYGEN SATURATION: 98 % | TEMPERATURE: 97.3 F | RESPIRATION RATE: 16 BRPM | SYSTOLIC BLOOD PRESSURE: 129 MMHG | HEART RATE: 56 BPM

## 2018-06-25 VITALS
HEART RATE: 56 BPM | RESPIRATION RATE: 16 BRPM | SYSTOLIC BLOOD PRESSURE: 112 MMHG | OXYGEN SATURATION: 98 % | DIASTOLIC BLOOD PRESSURE: 66 MMHG | TEMPERATURE: 97.9 F

## 2018-06-25 VITALS
DIASTOLIC BLOOD PRESSURE: 66 MMHG | SYSTOLIC BLOOD PRESSURE: 111 MMHG | HEART RATE: 47 BPM | RESPIRATION RATE: 16 BRPM | OXYGEN SATURATION: 98 % | TEMPERATURE: 97.3 F

## 2018-06-25 VITALS
HEART RATE: 70 BPM | OXYGEN SATURATION: 95 % | SYSTOLIC BLOOD PRESSURE: 119 MMHG | TEMPERATURE: 97.6 F | DIASTOLIC BLOOD PRESSURE: 71 MMHG | RESPIRATION RATE: 16 BRPM

## 2018-06-25 VITALS
TEMPERATURE: 98 F | RESPIRATION RATE: 16 BRPM | DIASTOLIC BLOOD PRESSURE: 57 MMHG | HEART RATE: 62 BPM | SYSTOLIC BLOOD PRESSURE: 120 MMHG | OXYGEN SATURATION: 97 %

## 2018-06-25 LAB
BUN SERPL-MCNC: 11 MG/DL (ref 7–18)
CALCIUM SERPL-MCNC: 9.3 MG/DL (ref 8.5–10.1)
CHLORIDE SERPL-SCNC: 108 MEQ/L (ref 98–107)
CREAT SERPL-MCNC: 0.63 MG/DL (ref 0.6–1.3)
GFR SERPLBLD BASED ON 1.73 SQ M-ARVRAT: 153 ML/MIN (ref 89–?)
GLUCOSE SERPL-MCNC: 87 MG/DL (ref 74–106)
HCO3 BLD-SCNC: 22.3 MEQ/L (ref 21–32)
SODIUM SERPL-SCNC: 141 MEQ/L (ref 136–145)

## 2018-06-25 RX ADMIN — Medication SCH ML: at 12:50

## 2018-06-25 RX ADMIN — STANDARDIZED SENNA CONCENTRATE AND DOCUSATE SODIUM SCH TAB: 8.6; 5 TABLET, FILM COATED ORAL at 09:00

## 2018-06-25 RX ADMIN — VANCOMYCIN HYDROCHLORIDE SCH MLS/HR: 1 INJECTION, SOLUTION INTRAVENOUS at 07:44

## 2018-06-25 RX ADMIN — VANCOMYCIN HYDROCHLORIDE SCH MLS/HR: 1 INJECTION, SOLUTION INTRAVENOUS at 14:12

## 2018-06-25 RX ADMIN — PHENYTOIN SODIUM SCH MLS/HR: 50 INJECTION INTRAMUSCULAR; INTRAVENOUS at 02:44

## 2018-06-25 RX ADMIN — PHENYTOIN SODIUM SCH MLS/HR: 50 INJECTION INTRAMUSCULAR; INTRAVENOUS at 14:00

## 2018-06-25 NOTE — PD.AMA
Against Medical Advice Note


Diagnosis:  


(1) Cellulitis of right hand


Discharge Disposition:  Against Medical Advice


AMA Statement


Patient Damian Chaudhry has decided to leave the hospital against medical 

advice. This patient has the capacity to refuse care and understands the risks 

of leaving, including permanent disability and/or death, and has had an 

opportunity to ask questions about his condition. The patient has been informed 

that he may return for care at any time, and follow up has been arranged/

advised.











Philly Viramontes Jun 25, 2018 18:08

## 2018-06-25 NOTE — HHI.PR
Subjective


Remarks


Follow-up on patient with right hand cellulitis.  Patient seen and examined.  

Patient denies any improvement in his right hand in the past 24 hours.  States 

he still has significant stiffness in the right thumb and has very limited 

range of motion.  He denies any fever or chills.  Denies any nausea or 

vomiting.  Denies any chest pain or shortness of breath.





Objective


Vitals





Vital Signs








  Date Time  Temp Pulse Resp B/P (MAP) Pulse Ox O2 Delivery O2 Flow Rate FiO2


 


6/25/18 08:00 97.6 45 16 115/71 (86) 99   


 


6/25/18 04:14 97.3 47 16 111/66 (81) 98   


 


6/25/18 00:15   20     


 


6/25/18 00:10 98.0 62 16 120/57 (78) 97   


 


6/24/18 20:45 97.6 67 16 133/68 (89) 98   


 


6/24/18 16:23 97.7 59 17 115/58 (77) 97   


 


6/24/18 16:16   17     














I/O      


 


 6/24/18 6/24/18 6/24/18 6/25/18 6/25/18 6/25/18





 07:00 15:00 23:00 07:00 15:00 23:00


 


Intake Total 500 ml 262.5 ml 890 ml 1200 ml  


 


Balance 500 ml 262.5 ml 890 ml 1200 ml  


 


      


 


Intake Oral 500 ml  640 ml 1200 ml  


 


IV Total  262.5 ml 250 ml   


 


# Voids 4  4 4  


 


# Bowel Movements   1   








Result Diagram:  


6/24/18 0620                                                                   

             6/25/18 0744





Imaging





Last Impressions








Soft Tissue Ultrasound 6/23/18 0000 Signed





Impressions: 





 CONCLUSION: 





 1.  Extensive soft tissue swelling right thumb without abscess.





  





 





  





 





  





 





  





 


 


Hand X-Ray 6/22/18 2211 Signed





Impressions: 





 CONCLUSION: 





 Negative examination of the hand.  





  





 





  





 





  





 





  





 








Objective Remarks


GENERAL: Well-developed well-nourished young  male patient in no acute 

distress.  Awake and alert.  Appears comfortable.


SKIN:  Warm and dry.  No generalized rash.


HEENT: PERRLA, EOMI. No scleral icterus or conjunctival pallor. No lid lag or 

facial droop.  


CARDIOVASCULAR: Regular rate and rhythm.  No obvious murmurs to auscultation. 

No chest tenderness to palpation. 


RESPIRATORY: Nonlabored.  No obvious rhonchi or wheezing. Clear to 

auscultation. Breath sounds equal bilaterally. 


GASTROINTESTINAL: Abdomen soft, non-tender, nondistended. BS normal. 


MUSCULOSKELETAL: Extremities without clubbing or cyanosis. No obvious 

deformities. Right hand erythema/edema mostly over the dorsal aspect of the 

right thumb, slightly decreased erythema over the back of the hand.  


Edema in right thumb improving. Decreased range of motion mostly in the right 

thumb.  Pulses intact.


NEUROLOGICAL: Awake, alert and oriented x4. CN II-XII grossly intact. Moving 

both upper and lower extremities spontaneously. Nonfocal.  Normal speech.


PSYCHIATRIC: Calm and cooperative.


Procedures


None





A/P


Problem List:  


(1) Hand abscess


ICD Code:  L02.519 - Cutaneous abscess of unspecified hand


(2) IVDU (intravenous drug user)


ICD Code:  F19.90 - Other psychoactive substance use, unspecified, uncomplicated


Assessment and Plan


27-year-old male with a PMH of ADHD, Paroxysmal SVT, OCD and IVDU who presented 

to ER with complaints of right hand swelling and pain.   Patient previously 

admitted 6/19 for similar complaints with MRI showing possible abscess, 

followed by ID and hand surgery on vancomycin and Zosyn left AMA on 6/22.





Right hand/thumb cellulitis


Recent admit 6/19/18 for similar, MRI Hand 6/20/18 w/ likely abscess of thumb 

extending into first metacarpal, images reviewed.  


Right hand US shows extensive soft tissue swelling without abscess


   -Hand surgery following, appreciate assistance.  Nonoperative management at 

this time.


   -Infectious disease following, appreciate assistance.  Continue vancomycin 

per ID recommendations.  ID to reevaluate patient today and give 

recommendations for further antibiotic management.


   -Monitor for clinical improvement


   


IVDU:  Pt counselled.  


   -Ativan prn as needed. 





Hypokalemia


   -resolved s/p repletion





DVT Prophylaxis:  SCD/Teds


Discharge Planning


Not ready for discharge.  Discharge pending ID clearance/antibiotic 

recommendations.











Philly Viramontes Jun 25, 2018 09:33

## 2018-06-26 ENCOUNTER — HOSPITAL ENCOUNTER (EMERGENCY)
Dept: HOSPITAL 17 - PHED | Age: 28
LOS: 1 days | Discharge: HOME | End: 2018-06-27
Payer: SELF-PAY

## 2018-06-26 VITALS — BODY MASS INDEX: 19.17 KG/M2 | HEIGHT: 72 IN | WEIGHT: 141.54 LBS

## 2018-06-26 VITALS
HEART RATE: 112 BPM | TEMPERATURE: 98.6 F | SYSTOLIC BLOOD PRESSURE: 130 MMHG | OXYGEN SATURATION: 97 % | DIASTOLIC BLOOD PRESSURE: 79 MMHG | RESPIRATION RATE: 16 BRPM

## 2018-06-26 DIAGNOSIS — L03.113: Primary | ICD-10-CM

## 2018-06-26 DIAGNOSIS — F12.90: ICD-10-CM

## 2018-06-26 DIAGNOSIS — Z72.0: ICD-10-CM

## 2018-06-26 PROCEDURE — 99283 EMERGENCY DEPT VISIT LOW MDM: CPT

## 2018-06-27 NOTE — PD
HPI


Chief Complaint:  Skin Problem


Time Seen by Provider:  01:54


Travel History


International Travel<30 days:  No


Contact w/Intl Traveler<30days:  No


Traveled to known affect area:  No





History of Present Illness


HPI


The patient is a 27-year-old left-hand-dominant male that was admitted to 

[facility] for cellulitis of the right hand.  Ultrasound and other 

imaging as well as evaluation of the hand by Dr. Berger revealed that it was 

cellulitis and no abscess was present.  The patient states she was behind on 

child support payments and needed to leave yesterday and signed out AMA before 

his treatment was complete.  He states that they were about to put him on oral 

antibiotics and discharging.  I cannot find this note in the [facility] 

records which I have just reviewed.  The patient states that he has hand is not 

any worse since he left on Monday, 2 days ago.  The patient has an adjustment 

disorder, history of drug abuse and mood disorder.





PFSH


Past Medical History


ADHD:  Yes


Cancer:  No


Cardiovascular Problems:  Yes (PRE SVT)


Diminished Hearing:  No


Endocrine:  No


Genitourinary:  No


Immune Disorder:  No


Musculoskeletal:  No


Neurologic:  No


Psychiatric:  Yes (OCD)


Respiratory:  No


Immunizations Current:  Yes


Tetanus Vaccination:  > 5 Years





Past Surgical History


Cardiac Surgery:  Yes (PSVT (CRYOABLATION))


Other Surgery:  Yes





Social History


Alcohol Use:  Yes


Tobacco Use:  Yes


Substance Use:  Yes (MARIJUANA, )





Allergies-Medications


(Allergen,Severity, Reaction):  


Coded Allergies:  


     No Known Allergies (Verified  Allergy, Unknown, 6/22/18)


Reported Meds & Prescriptions





Reported Meds & Active Scripts


Active


No Active Prescriptions or Reported Medications    








Review of Systems


Except as stated in HPI:  all other systems reviewed are Neg





Physical Exam


Narrative


GENERAL: Well-nourished, well-developed patient.


SKIN: Focused skin assessment warm/dry.


HEAD: Normocephalic.


EYES: No scleral icterus. No injection or drainage. 


NECK: Supple, trachea midline. No JVD or lymphadenopathy.


CARDIOVASCULAR: Regular rate and rhythm without murmurs, gallops, or rubs. 


RESPIRATORY: Breath sounds equal bilaterally. No accessory muscle use.


GASTROINTESTINAL: Abdomen soft, non-tender, nondistended. 


MUSCULOSKELETAL: No cyanosis, or edema. There is slight redness around the 

dorsum of the right thumb without any fluctuance or any evidence of joint 

infection, it does not hurt for him to flex and extend the first MP joint, 

extending to an area of about 3 x 2 cm.


BACK: Nontender without obvious deformity. No CVA tenderness.





Data


Data


Last Documented VS





Vital Signs








  Date Time  Temp Pulse Resp B/P (MAP) Pulse Ox O2 Delivery O2 Flow Rate FiO2


 


6/26/18 23:41 98.6 112 16 130/79 (96) 97   











MDM


Medical Decision Making


Medical Screen Exam Complete:  Yes


Emergency Medical Condition:  Yes


Medical Record Reviewed:  Yes


Differential Diagnosis


Cellulitis resolving, cellulitis worsening, abscess hand


Narrative Course


The patient does not have an abscess of the hand.  The cellulitis is either 

resolving or staying the same.  He will be given oral antibiotics, doxycycline 

and Septra for 10 days and he should follow-up with a hand surgeon, Dr. Berger.





Diagnosis





 Primary Impression:  


 Cellulitis of right hand


***Med/Other Pt SpecificInfo:  Prescription(s) given


Scripts


Sulfamethoxazole-Trimethoprim (Bactrim DS) 800-160 Mg Tab


1 TAB PO BID for Infection, #20 TAB 0 Refills


   Prov: Joey Longo MD         6/27/18 


Doxycycline Hyclate (Doxycycline Hyclate) 100 Mg Cap


100 MG PO BID for Infection, #20 CAP 0 Refills


   Prov: Joey Longo MD         6/27/18


Disposition:  01 DISCHARGE HOME


Condition:  Stable











Joey Longo MD Jun 27, 2018 02:09